# Patient Record
Sex: MALE | Race: WHITE | NOT HISPANIC OR LATINO | URBAN - METROPOLITAN AREA
[De-identification: names, ages, dates, MRNs, and addresses within clinical notes are randomized per-mention and may not be internally consistent; named-entity substitution may affect disease eponyms.]

---

## 2018-02-21 ENCOUNTER — INPATIENT (INPATIENT)
Facility: HOSPITAL | Age: 63
LOS: 0 days | Discharge: HOME | End: 2018-02-22
Attending: INTERNAL MEDICINE | Admitting: INTERNAL MEDICINE
Payer: COMMERCIAL

## 2018-02-21 VITALS
SYSTOLIC BLOOD PRESSURE: 117 MMHG | HEART RATE: 210 BPM | RESPIRATION RATE: 20 BRPM | DIASTOLIC BLOOD PRESSURE: 75 MMHG | OXYGEN SATURATION: 100 %

## 2018-02-21 DIAGNOSIS — Z29.9 ENCOUNTER FOR PROPHYLACTIC MEASURES, UNSPECIFIED: ICD-10-CM

## 2018-02-21 DIAGNOSIS — R79.89 OTHER SPECIFIED ABNORMAL FINDINGS OF BLOOD CHEMISTRY: ICD-10-CM

## 2018-02-21 DIAGNOSIS — R00.0 TACHYCARDIA, UNSPECIFIED: ICD-10-CM

## 2018-02-21 DIAGNOSIS — R94.31 ABNORMAL ELECTROCARDIOGRAM [ECG] [EKG]: ICD-10-CM

## 2018-02-21 DIAGNOSIS — E03.9 HYPOTHYROIDISM, UNSPECIFIED: ICD-10-CM

## 2018-02-21 LAB
ALBUMIN SERPL ELPH-MCNC: 4.7 G/DL — SIGNIFICANT CHANGE UP (ref 3.3–5)
ALP SERPL-CCNC: 77 U/L — SIGNIFICANT CHANGE UP (ref 40–120)
ALT FLD-CCNC: 78 U/L — HIGH (ref 4–41)
APTT BLD: 39.5 SEC — HIGH (ref 27.5–37.4)
APTT BLD: 57.6 SEC — HIGH (ref 27.5–37.4)
APTT BLD: 60.9 SEC — HIGH (ref 27.5–37.4)
AST SERPL-CCNC: 69 U/L — HIGH (ref 4–40)
BASOPHILS # BLD AUTO: 0.03 K/UL — SIGNIFICANT CHANGE UP (ref 0–0.2)
BASOPHILS # BLD AUTO: 0.04 K/UL — SIGNIFICANT CHANGE UP (ref 0–0.2)
BASOPHILS NFR BLD AUTO: 0.3 % — SIGNIFICANT CHANGE UP (ref 0–2)
BASOPHILS NFR BLD AUTO: 0.4 % — SIGNIFICANT CHANGE UP (ref 0–2)
BILIRUB SERPL-MCNC: 0.3 MG/DL — SIGNIFICANT CHANGE UP (ref 0.2–1.2)
BUN SERPL-MCNC: 17 MG/DL — SIGNIFICANT CHANGE UP (ref 7–23)
BUN SERPL-MCNC: 17 MG/DL — SIGNIFICANT CHANGE UP (ref 7–23)
CALCIUM SERPL-MCNC: 9.4 MG/DL — SIGNIFICANT CHANGE UP (ref 8.4–10.5)
CALCIUM SERPL-MCNC: 9.5 MG/DL — SIGNIFICANT CHANGE UP (ref 8.4–10.5)
CHLORIDE SERPL-SCNC: 101 MMOL/L — SIGNIFICANT CHANGE UP (ref 98–107)
CHLORIDE SERPL-SCNC: 104 MMOL/L — SIGNIFICANT CHANGE UP (ref 98–107)
CHOLEST SERPL-MCNC: 204 MG/DL — HIGH (ref 120–199)
CK MB BLD-MCNC: 1.4 — SIGNIFICANT CHANGE UP (ref 0–2.5)
CK MB BLD-MCNC: 2.7 — HIGH (ref 0–2.5)
CK MB BLD-MCNC: 3.4 — HIGH (ref 0–2.5)
CK MB BLD-MCNC: 5.32 NG/ML — SIGNIFICANT CHANGE UP (ref 1–6.6)
CK MB BLD-MCNC: 7.76 NG/ML — HIGH (ref 1–6.6)
CK MB BLD-MCNC: 7.87 NG/ML — HIGH (ref 1–6.6)
CK SERPL-CCNC: 233 U/L — HIGH (ref 30–200)
CK SERPL-CCNC: 285 U/L — HIGH (ref 30–200)
CK SERPL-CCNC: 377 U/L — HIGH (ref 30–200)
CO2 SERPL-SCNC: 27 MMOL/L — SIGNIFICANT CHANGE UP (ref 22–31)
CO2 SERPL-SCNC: 27 MMOL/L — SIGNIFICANT CHANGE UP (ref 22–31)
CREAT SERPL-MCNC: 0.91 MG/DL — SIGNIFICANT CHANGE UP (ref 0.5–1.3)
CREAT SERPL-MCNC: 1.12 MG/DL — SIGNIFICANT CHANGE UP (ref 0.5–1.3)
EOSINOPHIL # BLD AUTO: 0.08 K/UL — SIGNIFICANT CHANGE UP (ref 0–0.5)
EOSINOPHIL # BLD AUTO: 0.28 K/UL — SIGNIFICANT CHANGE UP (ref 0–0.5)
EOSINOPHIL NFR BLD AUTO: 1.1 % — SIGNIFICANT CHANGE UP (ref 0–6)
EOSINOPHIL NFR BLD AUTO: 2.4 % — SIGNIFICANT CHANGE UP (ref 0–6)
GLUCOSE SERPL-MCNC: 107 MG/DL — HIGH (ref 70–99)
GLUCOSE SERPL-MCNC: 147 MG/DL — HIGH (ref 70–99)
HBA1C BLD-MCNC: 6.1 % — HIGH (ref 4–5.6)
HCT VFR BLD CALC: 44.4 % — SIGNIFICANT CHANGE UP (ref 39–50)
HCT VFR BLD CALC: 44.9 % — SIGNIFICANT CHANGE UP (ref 39–50)
HCT VFR BLD CALC: 48 % — SIGNIFICANT CHANGE UP (ref 39–50)
HDLC SERPL-MCNC: 49 MG/DL — SIGNIFICANT CHANGE UP (ref 35–55)
HGB BLD-MCNC: 14.9 G/DL — SIGNIFICANT CHANGE UP (ref 13–17)
HGB BLD-MCNC: 15 G/DL — SIGNIFICANT CHANGE UP (ref 13–17)
HGB BLD-MCNC: 16.1 G/DL — SIGNIFICANT CHANGE UP (ref 13–17)
IMM GRANULOCYTES # BLD AUTO: 0.02 # — SIGNIFICANT CHANGE UP
IMM GRANULOCYTES # BLD AUTO: 0.03 # — SIGNIFICANT CHANGE UP
IMM GRANULOCYTES NFR BLD AUTO: 0.3 % — SIGNIFICANT CHANGE UP (ref 0–1.5)
IMM GRANULOCYTES NFR BLD AUTO: 0.3 % — SIGNIFICANT CHANGE UP (ref 0–1.5)
INR BLD: 0.97 — SIGNIFICANT CHANGE UP (ref 0.88–1.17)
LIPID PNL WITH DIRECT LDL SERPL: 143 MG/DL — SIGNIFICANT CHANGE UP
LYMPHOCYTES # BLD AUTO: 2.1 K/UL — SIGNIFICANT CHANGE UP (ref 1–3.3)
LYMPHOCYTES # BLD AUTO: 28.7 % — SIGNIFICANT CHANGE UP (ref 13–44)
LYMPHOCYTES # BLD AUTO: 45 % — HIGH (ref 13–44)
LYMPHOCYTES # BLD AUTO: 5.36 K/UL — HIGH (ref 1–3.3)
MAGNESIUM SERPL-MCNC: 2.2 MG/DL — SIGNIFICANT CHANGE UP (ref 1.6–2.6)
MCHC RBC-ENTMCNC: 29.3 PG — SIGNIFICANT CHANGE UP (ref 27–34)
MCHC RBC-ENTMCNC: 30.1 PG — SIGNIFICANT CHANGE UP (ref 27–34)
MCHC RBC-ENTMCNC: 30.4 PG — SIGNIFICANT CHANGE UP (ref 27–34)
MCHC RBC-ENTMCNC: 33.2 % — SIGNIFICANT CHANGE UP (ref 32–36)
MCHC RBC-ENTMCNC: 33.5 % — SIGNIFICANT CHANGE UP (ref 32–36)
MCHC RBC-ENTMCNC: 33.8 % — SIGNIFICANT CHANGE UP (ref 32–36)
MCV RBC AUTO: 88.2 FL — SIGNIFICANT CHANGE UP (ref 80–100)
MCV RBC AUTO: 89.9 FL — SIGNIFICANT CHANGE UP (ref 80–100)
MCV RBC AUTO: 90.1 FL — SIGNIFICANT CHANGE UP (ref 80–100)
MONOCYTES # BLD AUTO: 0.42 K/UL — SIGNIFICANT CHANGE UP (ref 0–0.9)
MONOCYTES # BLD AUTO: 0.75 K/UL — SIGNIFICANT CHANGE UP (ref 0–0.9)
MONOCYTES NFR BLD AUTO: 5.7 % — SIGNIFICANT CHANGE UP (ref 2–14)
MONOCYTES NFR BLD AUTO: 6.3 % — SIGNIFICANT CHANGE UP (ref 2–14)
NEUTROPHILS # BLD AUTO: 4.67 K/UL — SIGNIFICANT CHANGE UP (ref 1.8–7.4)
NEUTROPHILS # BLD AUTO: 5.45 K/UL — SIGNIFICANT CHANGE UP (ref 1.8–7.4)
NEUTROPHILS NFR BLD AUTO: 45.7 % — SIGNIFICANT CHANGE UP (ref 43–77)
NEUTROPHILS NFR BLD AUTO: 63.8 % — SIGNIFICANT CHANGE UP (ref 43–77)
NRBC # FLD: 0 — SIGNIFICANT CHANGE UP
PHOSPHATE SERPL-MCNC: 3.6 MG/DL — SIGNIFICANT CHANGE UP (ref 2.5–4.5)
PLATELET # BLD AUTO: 189 K/UL — SIGNIFICANT CHANGE UP (ref 150–400)
PLATELET # BLD AUTO: 197 K/UL — SIGNIFICANT CHANGE UP (ref 150–400)
PLATELET # BLD AUTO: 221 K/UL — SIGNIFICANT CHANGE UP (ref 150–400)
PMV BLD: 9.8 FL — SIGNIFICANT CHANGE UP (ref 7–13)
PMV BLD: 9.9 FL — SIGNIFICANT CHANGE UP (ref 7–13)
PMV BLD: 9.9 FL — SIGNIFICANT CHANGE UP (ref 7–13)
POTASSIUM SERPL-MCNC: 4 MMOL/L — SIGNIFICANT CHANGE UP (ref 3.5–5.3)
POTASSIUM SERPL-MCNC: 4.3 MMOL/L — SIGNIFICANT CHANGE UP (ref 3.5–5.3)
POTASSIUM SERPL-SCNC: 4 MMOL/L — SIGNIFICANT CHANGE UP (ref 3.5–5.3)
POTASSIUM SERPL-SCNC: 4.3 MMOL/L — SIGNIFICANT CHANGE UP (ref 3.5–5.3)
PROT SERPL-MCNC: 7.1 G/DL — SIGNIFICANT CHANGE UP (ref 6–8.3)
PROTHROM AB SERPL-ACNC: 10.8 SEC — SIGNIFICANT CHANGE UP (ref 9.8–13.1)
RBC # BLD: 4.93 M/UL — SIGNIFICANT CHANGE UP (ref 4.2–5.8)
RBC # BLD: 5.09 M/UL — SIGNIFICANT CHANGE UP (ref 4.2–5.8)
RBC # BLD: 5.34 M/UL — SIGNIFICANT CHANGE UP (ref 4.2–5.8)
RBC # FLD: 12.4 % — SIGNIFICANT CHANGE UP (ref 10.3–14.5)
RBC # FLD: 12.4 % — SIGNIFICANT CHANGE UP (ref 10.3–14.5)
RBC # FLD: 12.5 % — SIGNIFICANT CHANGE UP (ref 10.3–14.5)
SODIUM SERPL-SCNC: 141 MMOL/L — SIGNIFICANT CHANGE UP (ref 135–145)
SODIUM SERPL-SCNC: 143 MMOL/L — SIGNIFICANT CHANGE UP (ref 135–145)
T4 FREE SERPL-MCNC: 1.43 NG/DL — SIGNIFICANT CHANGE UP (ref 0.9–1.8)
TRIGL SERPL-MCNC: 157 MG/DL — HIGH (ref 10–149)
TROPONIN T SERPL-MCNC: 0.09 NG/ML — HIGH (ref 0–0.06)
TROPONIN T SERPL-MCNC: < 0.06 NG/ML — SIGNIFICANT CHANGE UP (ref 0–0.06)
TROPONIN T SERPL-MCNC: < 0.06 NG/ML — SIGNIFICANT CHANGE UP (ref 0–0.06)
TSH SERPL-MCNC: 2.41 UIU/ML — SIGNIFICANT CHANGE UP (ref 0.27–4.2)
WBC # BLD: 11.91 K/UL — HIGH (ref 3.8–10.5)
WBC # BLD: 6.54 K/UL — SIGNIFICANT CHANGE UP (ref 3.8–10.5)
WBC # BLD: 7.32 K/UL — SIGNIFICANT CHANGE UP (ref 3.8–10.5)
WBC # FLD AUTO: 11.91 K/UL — HIGH (ref 3.8–10.5)
WBC # FLD AUTO: 6.54 K/UL — SIGNIFICANT CHANGE UP (ref 3.8–10.5)
WBC # FLD AUTO: 7.32 K/UL — SIGNIFICANT CHANGE UP (ref 3.8–10.5)

## 2018-02-21 PROCEDURE — 76705 ECHO EXAM OF ABDOMEN: CPT | Mod: 26

## 2018-02-21 PROCEDURE — 99233 SBSQ HOSP IP/OBS HIGH 50: CPT

## 2018-02-21 PROCEDURE — 71045 X-RAY EXAM CHEST 1 VIEW: CPT | Mod: 26

## 2018-02-21 RX ORDER — HEPARIN SODIUM 5000 [USP'U]/ML
5000 INJECTION INTRAVENOUS; SUBCUTANEOUS EVERY 12 HOURS
Qty: 0 | Refills: 0 | Status: DISCONTINUED | OUTPATIENT
Start: 2018-02-21 | End: 2018-02-21

## 2018-02-21 RX ORDER — PANTOPRAZOLE SODIUM 20 MG/1
40 TABLET, DELAYED RELEASE ORAL
Qty: 0 | Refills: 0 | Status: DISCONTINUED | OUTPATIENT
Start: 2018-02-21 | End: 2018-02-22

## 2018-02-21 RX ORDER — ACETAMINOPHEN 500 MG
650 TABLET ORAL ONCE
Qty: 0 | Refills: 0 | Status: COMPLETED | OUTPATIENT
Start: 2018-02-21 | End: 2018-02-21

## 2018-02-21 RX ORDER — TRAMADOL HYDROCHLORIDE 50 MG/1
25 TABLET ORAL ONCE
Qty: 0 | Refills: 0 | Status: DISCONTINUED | OUTPATIENT
Start: 2018-02-21 | End: 2018-02-21

## 2018-02-21 RX ORDER — HEPARIN SODIUM 5000 [USP'U]/ML
INJECTION INTRAVENOUS; SUBCUTANEOUS
Qty: 25000 | Refills: 0 | Status: DISCONTINUED | OUTPATIENT
Start: 2018-02-21 | End: 2018-02-22

## 2018-02-21 RX ORDER — HEPARIN SODIUM 5000 [USP'U]/ML
5600 INJECTION INTRAVENOUS; SUBCUTANEOUS EVERY 6 HOURS
Qty: 0 | Refills: 0 | Status: DISCONTINUED | OUTPATIENT
Start: 2018-02-21 | End: 2018-02-22

## 2018-02-21 RX ORDER — SODIUM CHLORIDE 9 MG/ML
3 INJECTION INTRAMUSCULAR; INTRAVENOUS; SUBCUTANEOUS EVERY 8 HOURS
Qty: 0 | Refills: 0 | Status: DISCONTINUED | OUTPATIENT
Start: 2018-02-21 | End: 2018-02-22

## 2018-02-21 RX ORDER — METOPROLOL TARTRATE 50 MG
25 TABLET ORAL
Qty: 0 | Refills: 0 | Status: DISCONTINUED | OUTPATIENT
Start: 2018-02-21 | End: 2018-02-22

## 2018-02-21 RX ORDER — LEVOTHYROXINE SODIUM 125 MCG
175 TABLET ORAL DAILY
Qty: 0 | Refills: 0 | Status: DISCONTINUED | OUTPATIENT
Start: 2018-02-21 | End: 2018-02-22

## 2018-02-21 RX ORDER — HEPARIN SODIUM 5000 [USP'U]/ML
5000 INJECTION INTRAVENOUS; SUBCUTANEOUS ONCE
Qty: 0 | Refills: 0 | Status: DISCONTINUED | OUTPATIENT
Start: 2018-02-21 | End: 2018-02-21

## 2018-02-21 RX ORDER — ASPIRIN/CALCIUM CARB/MAGNESIUM 324 MG
162 TABLET ORAL ONCE
Qty: 0 | Refills: 0 | Status: COMPLETED | OUTPATIENT
Start: 2018-02-21 | End: 2018-02-21

## 2018-02-21 RX ADMIN — TRAMADOL HYDROCHLORIDE 25 MILLIGRAM(S): 50 TABLET ORAL at 15:55

## 2018-02-21 RX ADMIN — HEPARIN SODIUM 1000 UNIT(S)/HR: 5000 INJECTION INTRAVENOUS; SUBCUTANEOUS at 23:42

## 2018-02-21 RX ADMIN — SODIUM CHLORIDE 3 MILLILITER(S): 9 INJECTION INTRAMUSCULAR; INTRAVENOUS; SUBCUTANEOUS at 21:38

## 2018-02-21 RX ADMIN — Medication 650 MILLIGRAM(S): at 06:50

## 2018-02-21 RX ADMIN — SODIUM CHLORIDE 3 MILLILITER(S): 9 INJECTION INTRAMUSCULAR; INTRAVENOUS; SUBCUTANEOUS at 13:15

## 2018-02-21 RX ADMIN — HEPARIN SODIUM 1000 UNIT(S)/HR: 5000 INJECTION INTRAVENOUS; SUBCUTANEOUS at 10:35

## 2018-02-21 RX ADMIN — Medication 162 MILLIGRAM(S): at 01:38

## 2018-02-21 RX ADMIN — TRAMADOL HYDROCHLORIDE 25 MILLIGRAM(S): 50 TABLET ORAL at 14:57

## 2018-02-21 RX ADMIN — SODIUM CHLORIDE 3 MILLILITER(S): 9 INJECTION INTRAMUSCULAR; INTRAVENOUS; SUBCUTANEOUS at 06:00

## 2018-02-21 RX ADMIN — HEPARIN SODIUM 1000 UNIT(S)/HR: 5000 INJECTION INTRAVENOUS; SUBCUTANEOUS at 17:30

## 2018-02-21 RX ADMIN — Medication 175 MICROGRAM(S): at 06:00

## 2018-02-21 RX ADMIN — Medication 25 MILLIGRAM(S): at 18:28

## 2018-02-21 RX ADMIN — Medication 650 MILLIGRAM(S): at 06:20

## 2018-02-21 NOTE — CONSULT NOTE ADULT - SUBJECTIVE AND OBJECTIVE BOX
Patient seen and evaluated at bedside    Chief Complaint: palpitations    HPI:  63M with hypothyroidism presents with palpitations and chest pressure since 11:30 pm. Patient was sleeping and woke up with palpitations. Palpitations were constant, and associated with chest burning, diaphoresis and SOB. He waited an hour to see if the palpitations would resolve, then came to the hospital at approximately 1:30AM. He was found to have HR in 210s with narrow complex regular tachyarrhythmia, which broke with valsalva maneuver. Denies fever, chills, cough, falls, LOC, abdominal pain, melena, hematochezia, LE edema, calf tenderness, dysuria, diarrhea, or constipation.  Patient never experienced this before. He only takes synthroid at home and claims that his hypothyroidism has been well controlled. He denies taking herbal supplements. He drinks 2 cups of coffee a day and on average has 4 drinks of alcohol a week. He works as a banker and says that recently he has had some increased stress with his job.      PMH:   Hypothyroid      PSH:   No significant past surgical history      Medications:   heparin  Infusion.  Unit(s)/Hr IV Continuous <Continuous>  heparin  Injectable 5600 Unit(s) IV Push every 6 hours PRN  levothyroxine 175 MICROGram(s) Oral daily  sodium chloride 0.9% lock flush 3 milliLiter(s) IV Push every 8 hours      Allergies:  multiple drugs (Unknown)  predniSONE (Unknown)  steroids (Unknown)      FAMILY HISTORY:  No pertinent family history in first degree relatives      Social History:  Smoking History: denies  Alcohol Use: socially  Drug Use: denies    Review of Systems:  REVIEW OF SYSTEMS:    CONSTITUTIONAL: No weakness, fevers or chills  EYES/ENT: No visual changes;  No dysphagia  NECK: No pain or stiffness  RESPIRATORY: No cough, wheezing, hemoptysis; No shortness of breath  CARDIOVASCULAR: +chest pain; +palpitations; No lower extremity edema  GASTROINTESTINAL: No abdominal or epigastric pain. No nausea, vomiting, or hematemesis; No diarrhea or constipation. No melena or hematochezia.  BACK: No back pain  GENITOURINARY: No dysuria, frequency or hematuria  NEUROLOGICAL: No numbness or weakness  SKIN: No itching, burning, rashes, or lesions   All other review of systems is negative unless indicated above.    Physical Exam:  T(F): 97.5 (02-21), Max: 97.8 (02-21)  HR: 70 () (70 - 210)  BP: 133/71 () (117/75 - 145/72)  RR: 15 ()  SpO2: 98% ()  GENERAL: No acute distress, well-developed  HEAD:  Atraumatic, Normocephalic  ENT: EOMI, No JVD, moist mucosa  CHEST/LUNG: Clear to auscultation bilaterally; No wheeze, equal breath sounds bilaterally   HEART: Regular rate and rhythm; No murmurs, rubs, or gallops  ABDOMEN: Soft, Nontender, Nondistended; Bowel sounds present  EXTREMITIES:  No clubbing, cyanosis, or edema  PSYCH: Nl behavior, nl affect  NEUROLOGY: AAOx3, non-focal    Cardiovascular Diagnostic Testing:    ECG: Personally reviewed  SR, HR 90, normal axis with normal intervals    Labs: Personally reviewed                        15.0   7.32  )-----------( 189      ( 2018 07:25 )             44.4         141  |  104  |  17  ----------------------------<  107<H>  4.3   |  27  |  0.91    Ca    9.4      2018 07:25  Phos  3.6       Mg     2.2         TPro  7.1  /  Alb  4.7  /  TBili  0.3  /  DBili  x   /  AST  69<H>  /  ALT  78<H>  /  AlkPhos  77      PT/INR - ( 2018 01:52 )   PT: 10.8 SEC;   INR: 0.97          PTT - ( 2018 01:52 )  PTT:39.5 SEC  CARDIAC MARKERS ( 2018 07:25 )  x     / 0.09 ng/mL / 285 u/L / 7.76 ng/mL / x      CARDIAC MARKERS ( 2018 01:52 )  x     / < 0.06 ng/mL / 377 u/L / 5.32 ng/mL / x            Total Cholesterol: 204  LDL: 143  HDL: 49  T      Thyroid Stimulating Hormone, Serum: 2.41 uIU/mL ( @ 01:52)      A/P:  63M with hypothyroidism presents with palpitations and chest pressure found to be in SVT, converted to NSR with valsalva maneuver.    SVT. Now in SR. Short RP tachycardia, could be AVNRT.   - patient opposed to ablation  - TTE pending  - metoprolol 25 mg BID for suppression    Rogerio Piedra MD  Cardiology Fellow [id number]

## 2018-02-21 NOTE — H&P ADULT - HISTORY OF PRESENT ILLNESS
64 y/o F with hx of hypothyroidism presents with palpitations since 11:30 pm. Patient was sleeping and woke up with palpitations. Palpitations were constant, and associated with chest burning, diaphoresis and SOB. He wait an hour to see if the palpitations will resolve but it did not resolve on its own. He came to ED approximately 1:30A. He was found to have HR in 210s with narrow complex regular tachyarrhythmia and which was broke with valsalva maneuver. Denies fever, chills, cough, falls, LOC, abdominal pain, melena, hematochezia, LE edema, claf tenderness, dysuria, diarrhea, or constipation.  He states he was doing well and had no complaints prior.

## 2018-02-21 NOTE — H&P ADULT - PROBLEM SELECTOR PLAN 1
Admit to tele  check cbc, bmp, a1c, flp, tsh, trend CE  echo ordered   s/p cardizem x1   f/u MD note

## 2018-02-21 NOTE — ED PROVIDER NOTE - PROGRESS NOTE DETAILS
BP remains stable, pt alternating between narrow complex tachyarrhythmia and sinus rhythm. Alert throughout, responds to vagal maneuvers. Accepted for BP remains stable, pt alternating between narrow complex tachyarrhythmia and sinus rhythm. Alert throughout, responds to vagal maneuvers. Accepted for admission to tele doc (dr. Azevedo). Requests 30mg PO cardizem be given.

## 2018-02-21 NOTE — ED PROVIDER NOTE - MEDICAL DECISION MAKING DETAILS
63M with initial narrow complex tachyarrhythmia, resolved after valsalva, now with hyperacute T waves and poor R wave progression on EKG, stable VS. Give aspirin, obtain labs, cardiac enzymes, serial ekgs, cxr, will require tele admission.

## 2018-02-21 NOTE — CONSULT NOTE ADULT - PROBLEM SELECTOR RECOMMENDATION 2
- mildly elevated; pt drank 2-3 beers yesterday evening (night of admission)  - US Abd with hepatic steatosis   - hepatitis panel pending

## 2018-02-21 NOTE — CONSULT NOTE ADULT - SUBJECTIVE AND OBJECTIVE BOX
Chief Complaint:  Patient is a 63y old  Male who presents with a chief complaint of palpitations (2018 05:53)    Hypothyroid  No significant past surgical history     HPI:  64 y/o F with hx of hypothyroidism presents with palpitations since 11:30 pm. Patient was sleeping and woke up with palpitations. Palpitations were constant, and associated with chest burning, diaphoresis and SOB. He waited an hour to see if the palpitations will resolve but it did not resolve on its own. He came to ED approximately 1:30A. He was found to have HR in 210s with narrow complex regular tachyarrhythmia and which was broke with valsalva maneuver.  The patient reports no abdominal complaints. No n/v/d/ or abdominal pain. He is noted with mildly increased LFTs while in the ED. He admits that last night he drank 2-3 beers after work. He is a social drinker, no recreational drug use. He had hepatitis A when he was 19-20 years old after eating clams. Last year he had GERD symptoms for which he went to an ENT and got a nasal scope and he was started on anti-reflux medications with relief and is no longer with any difficulties.  Denies fever, chills, cough, falls, LOC, abdominal pain, melena, hematochezia, LE edema, claf tenderness, dysuria, diarrhea, or constipation.  He states he was doing well and had no complaints prior.     Colonoscopy about 9 years ago was "normal"      multiple drugs (Unknown)  predniSONE (Unknown)  steroids (Unknown)      heparin  Infusion.  Unit(s)/Hr IV Continuous <Continuous>  heparin  Injectable 5600 Unit(s) IV Push every 6 hours PRN  levothyroxine 175 MICROGram(s) Oral daily  sodium chloride 0.9% lock flush 3 milliLiter(s) IV Push every 8 hours  traMADol 25 milliGRAM(s) Oral once        FAMILY HISTORY:  No pertinent family history in first degree relatives        Review of Systems:    General:  No wt loss, fevers, chills, night sweats, fatigue  Eyes:  Good vision, no reported pain  ENT:  No sore throat, pain, runny nose, dysphagia  CV:  No pain, palpitations, no lightheadedness  Resp:  No dyspnea, cough, tachypnea, wheezing  GI: as above  :  No pain, bleeding, incontinence, nocturia  Muscle:  No pain, weakness  Neuro:  No weakness, tingling, memory problems  Psych:  No fatigue, insomnia, mood problems, depression  Endocrine:  No polyuria, polydypsia, cold/heat intolerance  Heme:  No petechiae, ecchymosis, easy bruisability  Skin:  No rash, tattoos, scars, edema    Relevant Family History:   n/c    Relevant Social History: n/c      Physical Exam:    Vital Signs:  Vital Signs Last 24 Hrs  T(C): 36.4 (2018 07:31), Max: 36.6 (2018 01:24)  T(F): 97.5 (2018 07:31), Max: 97.8 (2018 01:24)  HR: 65 (:34) (65 - 210)  BP: 173/91 (2018 13:34) (117/75 - 173/91)  BP(mean): --  RR: 16 (:34) (15 - 22)  SpO2: 100% (:) (98% - 100%)  Daily Height in cm: 180.34 (2018 06:00)    Daily Weight in k.7 (2018 06:00)    General:  Appears stated age, well-groomed, nad  HEENT:  NC/AT,  conjunctivae clear and pink, no thyromegaly, nodules, adenopathy, no JVD  Chest:  Full & symmetric excursion, no increased effort, breath sounds clear  Cardiovascular:  Regular rhythm, S1, S2, no murmur/rub/S3/S4, no abdominal bruit, no edema  Abdomen:  Soft, non-tender, non-distended, normoactive bowel sounds,  no masses ,no hepatosplenomeagaly, no signs of chronic liver disease  Extremities:  no cyanosis,clubbing or edema  Skin:  No rash/erythema/ecchymoses/petechiae/wounds/abscess/warm/dry  Neuro/Psych:  A&O  , no asterixis, no tremor, no encephalopathy    Laboratory:                            15.0   7.32  )-----------( 189      ( 2018 07:25 )             44.4     -    141  |  104  |  17  ----------------------------<  107<H>  4.3   |  27  |  0.91    Ca    9.4      2018 07:25  Phos  3.6       Mg     2.2         TPro  7.1  /  Alb  4.7  /  TBili  0.3  /  DBili  x   /  AST  69<H>  /  ALT  78<H>  /  AlkPhos  77      LIVER FUNCTIONS - ( 2018 01:52 )  Alb: 4.7 g/dL / Pro: 7.1 g/dL / ALK PHOS: 77 u/L / ALT: 78 u/L / AST: 69 u/L / GGT: x           PT/INR - ( 2018 01:52 )   PT: 10.8 SEC;   INR: 0.97          PTT - ( 2018 01:52 )  PTT:39.5 SEC      Imaging:      < from: US Abdomen Limited (18 @ 13:05) >    EXAM:  US ABDOMEN LIMITED        PROCEDURE DATE:  2018         INTERPRETATION:  CLINICAL INFORMATION: Elevated liver function tests.    COMPARISON: None available.    TECHNIQUE: Sonography of the right upper quadrant.     FINDINGS:    Liver:Mildly diffuse increased echogenicity.    Bile ducts: Normal caliber. Common hepatic duct measures 2 mm.     Gallbladder: Within normal limits.        Pancreas: Visualized portions are within normal limits.    Right kidney: 10.9 cm. No hydronephrosis.    Ascites: None.    IVC: Visualized portions are within normal limits.    IMPRESSION:     Suggestive of mild diffuse hepatic steatosis.                  NAHUM PIERRE M.D., ATTENDING RADIOLOGIST  This document has been electronically signed. 2018  1:30PM                  < end of copied text >

## 2018-02-21 NOTE — ED PROVIDER NOTE - ATTENDING CONTRIBUTION TO CARE
64 y/o M h/o hypothyroidism, hyperlipidemia p/w palpitations.  Pt reports he was awoken from sleep at 11:30pm with sensation of palpitations (described as his heart beating rapidly in his chest).  Pt endorses assocaited midsternal burning sensation.  No previous h/o similar sxs.  No fever, chills, cough, sob, back pain.  No recent travel, sick contacts, or recent illness.  Pt states he went to bed last night in his usual state of health.  Denies drug, alcohol or recent caffeine use.  Pt arrived in triage and found to be tachycardic to 200s.  Pt brought back to trauma C, placed on cardiac monitor with a narrow complex tachycardia of 211, which improved to 90s after vagal maneuvers.  Well appearing, sitting comfortably in stretcher, awake and alert, nontoxic.  VSS, tachycardic, normotensive.  Lungs cta bl.  Cards nl S1/S2, tachy reg, no MRG.  Abd soft ntnd.  No pedal edema or calf tenderness.  Plan for ekg, labs incl cardiac and tsh, cxr, asa, admit tele for new narrow complex tachycardia, likely svt, with chest discomfort.  r/o acs vs metabolic/thyroid vs infectious.

## 2018-02-21 NOTE — ED ADULT NURSE NOTE - OBJECTIVE STATEMENT
sydney rn-pt received trauma c, alert and orientedx3. c.o palpitations and diaphoresis waking him from sleep at 1130pm. denies chest pain dizziness sob nausea vomiting. heart rate noted to be in 200's. MD plummer at bedside. pt able to convert hr to normal sinus with valsalva maneuver. 20g IV placed, right ac. labs sent. EKG being performed. rpt given to SIMEON anne

## 2018-02-21 NOTE — ED PROVIDER NOTE - OBJECTIVE STATEMENT
63M PMH hypothyroidism p/w sudden onset palpitations at 11:30pm that woke him from sleep, a/w diaphoresis and SOB. No discrete chest pain. Felt fine during the day prior to this. Arrived in ED with HR in 210's in narrow complex regular tachyarrhythmia, brought directly to trauma C where rhythm broke with valsalva maneuver while blood being drawn.

## 2018-02-21 NOTE — H&P ADULT - PROBLEM SELECTOR PLAN 2
EKG with <1mm diffuse DESHAUN. Most likely early repol. v ?pericarditis but less likely given pt is chest pain free. Cardiac enzymes negative.  Patient also with no recent illness or respiratory illness.   Echo ordered

## 2018-02-21 NOTE — ED ADULT TRIAGE NOTE - CHIEF COMPLAINT QUOTE
pt woke up with palpitation. appears very pale, diaphoretic, c/o chest discomfort and SOB. pt is cold and clammy. says " I am very nervous". PMH of Hypothyroidism, Has multiple medication allergies.

## 2018-02-21 NOTE — CONSULT NOTE ADULT - ASSESSMENT
62 y/o F with hx of hypothyroidism presents with palpitations since 11:30 pm admitted for episode of tachyarrhythmia being followed by EP. He was also noted with increased LFTs with US Abd noted w/hepatic steatosis.

## 2018-02-21 NOTE — H&P ADULT - NSHPLABSRESULTS_GEN_ALL_CORE
EKG: EKG: NSR 96, Diffuse <1mm DESHAUN                          16.1   11.91 )-----------( 221      ( 21 Feb 2018 01:52 )             48.0     02-21    143  |  101  |  17  ----------------------------<  147<H>  4.0   |  27  |  1.12    Ca    9.5      21 Feb 2018 01:52    TPro  7.1  /  Alb  4.7  /  TBili  0.3  /  DBili  x   /  AST  69<H>  /  ALT  78<H>  /  AlkPhos  77  02-21    CARDIAC MARKERS ( 21 Feb 2018 01:52 )  x     / < 0.06 ng/mL / 377 u/L / 5.32 ng/mL / x

## 2018-02-21 NOTE — CONSULT NOTE ADULT - ATTENDING COMMENTS
Symptomatic sustained SVT terminated with carotid sinus massage. No strips with termination. Wants to try meds first. Will start metoprolol and follow as outpt if recurs. Ablation discussed with patient if recurs in future.

## 2018-02-22 ENCOUNTER — TRANSCRIPTION ENCOUNTER (OUTPATIENT)
Age: 63
End: 2018-02-22

## 2018-02-22 VITALS — HEART RATE: 70 BPM | DIASTOLIC BLOOD PRESSURE: 84 MMHG | SYSTOLIC BLOOD PRESSURE: 143 MMHG

## 2018-02-22 LAB
APTT BLD: 57.2 SEC — HIGH (ref 27.5–37.4)
BUN SERPL-MCNC: 14 MG/DL — SIGNIFICANT CHANGE UP (ref 7–23)
CALCIUM SERPL-MCNC: 8.8 MG/DL — SIGNIFICANT CHANGE UP (ref 8.4–10.5)
CHLORIDE SERPL-SCNC: 102 MMOL/L — SIGNIFICANT CHANGE UP (ref 98–107)
CK MB BLD-MCNC: 2.6 — HIGH (ref 0–2.5)
CK MB BLD-MCNC: 4.01 NG/ML — SIGNIFICANT CHANGE UP (ref 1–6.6)
CK SERPL-CCNC: 156 U/L — SIGNIFICANT CHANGE UP (ref 30–200)
CO2 SERPL-SCNC: 24 MMOL/L — SIGNIFICANT CHANGE UP (ref 22–31)
CREAT SERPL-MCNC: 0.87 MG/DL — SIGNIFICANT CHANGE UP (ref 0.5–1.3)
GLUCOSE SERPL-MCNC: 111 MG/DL — HIGH (ref 70–99)
HAV IGM SER-ACNC: NONREACTIVE — SIGNIFICANT CHANGE UP
HBV CORE IGM SER-ACNC: NONREACTIVE — SIGNIFICANT CHANGE UP
HBV SURFACE AG SER-ACNC: NONREACTIVE — SIGNIFICANT CHANGE UP
HCT VFR BLD CALC: 44.7 % — SIGNIFICANT CHANGE UP (ref 39–50)
HCV AB S/CO SERPL IA: 0.07 S/CO — SIGNIFICANT CHANGE UP
HCV AB SERPL-IMP: SIGNIFICANT CHANGE UP
HGB BLD-MCNC: 15.2 G/DL — SIGNIFICANT CHANGE UP (ref 13–17)
MAGNESIUM SERPL-MCNC: 2 MG/DL — SIGNIFICANT CHANGE UP (ref 1.6–2.6)
MCHC RBC-ENTMCNC: 30.4 PG — SIGNIFICANT CHANGE UP (ref 27–34)
MCHC RBC-ENTMCNC: 34 % — SIGNIFICANT CHANGE UP (ref 32–36)
MCV RBC AUTO: 89.4 FL — SIGNIFICANT CHANGE UP (ref 80–100)
NRBC # FLD: 0 — SIGNIFICANT CHANGE UP
PLATELET # BLD AUTO: 197 K/UL — SIGNIFICANT CHANGE UP (ref 150–400)
PMV BLD: 10 FL — SIGNIFICANT CHANGE UP (ref 7–13)
POTASSIUM SERPL-MCNC: 4.1 MMOL/L — SIGNIFICANT CHANGE UP (ref 3.5–5.3)
POTASSIUM SERPL-SCNC: 4.1 MMOL/L — SIGNIFICANT CHANGE UP (ref 3.5–5.3)
RBC # BLD: 5 M/UL — SIGNIFICANT CHANGE UP (ref 4.2–5.8)
RBC # FLD: 12.5 % — SIGNIFICANT CHANGE UP (ref 10.3–14.5)
SODIUM SERPL-SCNC: 139 MMOL/L — SIGNIFICANT CHANGE UP (ref 135–145)
TROPONIN T SERPL-MCNC: < 0.06 NG/ML — SIGNIFICANT CHANGE UP (ref 0–0.06)
WBC # BLD: 6.82 K/UL — SIGNIFICANT CHANGE UP (ref 3.8–10.5)
WBC # FLD AUTO: 6.82 K/UL — SIGNIFICANT CHANGE UP (ref 3.8–10.5)

## 2018-02-22 PROCEDURE — 93306 TTE W/DOPPLER COMPLETE: CPT | Mod: 26

## 2018-02-22 PROCEDURE — 99232 SBSQ HOSP IP/OBS MODERATE 35: CPT

## 2018-02-22 RX ORDER — ASPIRIN/CALCIUM CARB/MAGNESIUM 324 MG
81 TABLET ORAL DAILY
Qty: 0 | Refills: 0 | Status: DISCONTINUED | OUTPATIENT
Start: 2018-02-22 | End: 2018-02-22

## 2018-02-22 RX ORDER — ASPIRIN/CALCIUM CARB/MAGNESIUM 324 MG
1 TABLET ORAL
Qty: 30 | Refills: 0 | OUTPATIENT
Start: 2018-02-22 | End: 2018-03-23

## 2018-02-22 RX ORDER — TRAMADOL HYDROCHLORIDE 50 MG/1
25 TABLET ORAL ONCE
Qty: 0 | Refills: 0 | Status: DISCONTINUED | OUTPATIENT
Start: 2018-02-22 | End: 2018-02-22

## 2018-02-22 RX ORDER — PANTOPRAZOLE SODIUM 20 MG/1
1 TABLET, DELAYED RELEASE ORAL
Qty: 30 | Refills: 0 | OUTPATIENT
Start: 2018-02-22 | End: 2018-03-23

## 2018-02-22 RX ORDER — METOPROLOL TARTRATE 50 MG
1 TABLET ORAL
Qty: 60 | Refills: 0 | OUTPATIENT
Start: 2018-02-22 | End: 2018-03-23

## 2018-02-22 RX ADMIN — SODIUM CHLORIDE 3 MILLILITER(S): 9 INJECTION INTRAMUSCULAR; INTRAVENOUS; SUBCUTANEOUS at 05:47

## 2018-02-22 RX ADMIN — Medication 81 MILLIGRAM(S): at 12:36

## 2018-02-22 RX ADMIN — SODIUM CHLORIDE 3 MILLILITER(S): 9 INJECTION INTRAMUSCULAR; INTRAVENOUS; SUBCUTANEOUS at 13:47

## 2018-02-22 RX ADMIN — Medication 25 MILLIGRAM(S): at 05:48

## 2018-02-22 RX ADMIN — TRAMADOL HYDROCHLORIDE 25 MILLIGRAM(S): 50 TABLET ORAL at 13:37

## 2018-02-22 RX ADMIN — Medication 25 MILLIGRAM(S): at 17:49

## 2018-02-22 RX ADMIN — HEPARIN SODIUM 1000 UNIT(S)/HR: 5000 INJECTION INTRAVENOUS; SUBCUTANEOUS at 06:58

## 2018-02-22 RX ADMIN — TRAMADOL HYDROCHLORIDE 25 MILLIGRAM(S): 50 TABLET ORAL at 15:32

## 2018-02-22 RX ADMIN — PANTOPRAZOLE SODIUM 40 MILLIGRAM(S): 20 TABLET, DELAYED RELEASE ORAL at 05:48

## 2018-02-22 RX ADMIN — Medication 175 MICROGRAM(S): at 05:48

## 2018-02-22 RX ADMIN — TRAMADOL HYDROCHLORIDE 25 MILLIGRAM(S): 50 TABLET ORAL at 06:57

## 2018-02-22 RX ADMIN — TRAMADOL HYDROCHLORIDE 25 MILLIGRAM(S): 50 TABLET ORAL at 06:21

## 2018-02-22 NOTE — DISCHARGE NOTE ADULT - CARE PROVIDER_API CALL
Zen Azevedo), Cardiovascular Disease; Internal Medicine  [address] [address]  Phone: [phone]  Fax: [phone]

## 2018-02-22 NOTE — DISCHARGE NOTE ADULT - MEDICATION SUMMARY - MEDICATIONS TO TAKE
I will START or STAY ON the medications listed below when I get home from the hospital:    Aspirin Enteric Coated 81 mg oral delayed release tablet  -- 1 tab(s) by mouth once a day  -- Indication: For CAD    Metoprolol Tartrate 25 mg oral tablet  -- 1 tab(s) by mouth 2 times a day  -- Indication: For Tachyarrhythmia    pantoprazole 40 mg oral delayed release tablet  -- 1 tab(s) by mouth once a day  -- Indication: For GERD    levothyroxine 175 mcg (0.175 mg) oral tablet  -- 1 tab(s) by mouth once a day  -- Indication: For Hypothyroid

## 2018-02-22 NOTE — DISCHARGE NOTE ADULT - PATIENT PORTAL LINK FT
You can access the [facility] Patient Portal, offered by [facility], by registering with the following website: [email or web address]

## 2018-02-22 NOTE — DISCHARGE NOTE ADULT - HOSPITAL COURSE
64 y/o F with hx of hypothyroidism presents with palpitations since 11:30 pm. Patient was sleeping and woke up with palpitations. Palpitations were constant, and associated with chest burning, diaphoresis and SOB. He wait an hour to see if the palpitations will resolve but it did not resolve on its own. He came to ED approximately 1:30A. He was found to have HR in 210s with narrow complex regular tachyarrhythmia and which was broke with valsalva maneuver. Denies fever, chills, cough, falls, LOC, abdominal pain, melena, hematochezia, LE edema, calf tenderness, dysuria, diarrhea, or constipation.  He states he was doing well and had no complaints prior.     In the ED patient was found to be SVT    Patient was seen by cardiology who recommended "EP input appreciated, Pt refusing ablation and wants medical therapy, elevated CE, likely stress induced due to SVT with very high rate, cant rule out underlying cad  is offered ischemic work up including cath , risk / benefit discussed with pt and he refusing and wants to do stress test outpt, recheck enzymes, echo, dc hep, asa, off statin due to elevated LFT, cont BB 64 y/o F with hx of hypothyroidism presents with palpitations since 11:30 pm. Patient was sleeping and woke up with palpitations. Palpitations were constant, and associated with chest burning, diaphoresis and SOB. He wait an hour to see if the palpitations will resolve but it did not resolve on its own. He came to ED approximately 1:30A. He was found to have HR in 210s with narrow complex regular tachyarrhythmia and which was broke with valsalva maneuver. Denies fever, chills, cough, falls, LOC, abdominal pain, melena, hematochezia, LE edema, calf tenderness, dysuria, diarrhea, or constipation.  He states he was doing well and had no complaints prior.     In the ED patient was found to be SVT and converted to NSR by valsalva maneuver. Patient was seen by house EP who recommended that patient opposed to ablation, TTE pending, metoprolol 25 mg BID for suppression. Patient was also seen by cardiology who recommended "EP input appreciated, Pt refusing ablation and wants medical therapy, elevated CE, likely stress induced due to SVT with very high rate, cant rule out underlying. Possible cath this admission is offered ischemic work up including cath , risk / benefit discussed with pt and he refusing and wants to do stress test outpt, recheck enzymes, echo, dc hep, asa, off statin due to elevated LFT, cont BB". Patient's initial CE was negative but second set of cardiac enzymes trended up and patient was started on heparin drip for possible NSTEMI 2/2 to rapid heart rate. Repeat cardiac enzymes were negative so heparin drip was discontinued. Patient was also seen by GI who stated that "patient drank 2-3 beers yesterday evening (night of admission), US Abd with hepatic steatosis, hepatitis panel pending and no further inpatient GI workup necessary at this time". Patient also had a TTE which revealed "Normal trileaflet aortic valve. Normal left ventricular internal dimensions and wall thicknesses. Normal left ventricular systolic function. No segmental wall motion abnormalities. Normal right ventricular size and function. *** No previous Echo exam. EF of 65%. Spoke with Dr. Azevedo and patient is cleared for discharge and is to follow up with him in 1 week.     CARDIAC MARKERS ( 22 Feb 2018 05:57 )  x     / < 0.06 ng/mL / 156 u/L / 4.01 ng/mL / x      CARDIAC MARKERS ( 21 Feb 2018 13:49 )  x     / < 0.06 ng/mL / 233 u/L / 7.87 ng/mL / x      CARDIAC MARKERS ( 21 Feb 2018 07:25 )  x     / 0.09 ng/mL / 285 u/L / 7.76 ng/mL / x      CARDIAC MARKERS ( 21 Feb 2018 01:52 )  x     / < 0.06 ng/mL / 377 u/L / 5.32 ng/mL / x

## 2018-02-22 NOTE — PROGRESS NOTE ADULT - PROBLEM SELECTOR PLAN 2
- mildly elevated  - US Abd with hepatic steatosis   - hepatitis panel pending  - no further inpatient gi workup necessary at this time

## 2018-02-22 NOTE — PROGRESS NOTE ADULT - SUBJECTIVE AND OBJECTIVE BOX
INTERVAL HPI/OVERNIGHT EVENTS:    denies n/v/d/c, abdominal pain, melena or brbpr     MEDICATIONS  (STANDING):  aspirin enteric coated 81 milliGRAM(s) Oral daily  levothyroxine 175 MICROGram(s) Oral daily  metoprolol     tartrate 25 milliGRAM(s) Oral two times a day  pantoprazole    Tablet 40 milliGRAM(s) Oral before breakfast  sodium chloride 0.9% lock flush 3 milliLiter(s) IV Push every 8 hours  traMADol 25 milliGRAM(s) Oral once    MEDICATIONS  (PRN):      Allergies    multiple drugs (Unknown)  predniSONE (Unknown)  steroids (Unknown)    Intolerances        Review of Systems:    General:  No wt loss, fevers, chills, night sweats, fatigue   Eyes:  Good vision, no reported pain  ENT:  No sore throat, pain, runny nose, dysphagia  CV:  No pain, palpitations, hypo/hypertension  Resp:  No dyspnea, cough, tachypnea, wheezing  GI:  No pain, No nausea, No vomiting, No diarrhea, No constipation, No weight loss, No fever, No pruritis, No rectal bleeding, No melena, No dysphagia  :  No pain, bleeding, incontinence, nocturia  Muscle:  No pain, weakness  Neuro:  No weakness, tingling, memory problems  Psych:  No fatigue, insomnia, mood problems, depression  Endocrine:  No polyuria, polydypsia, cold/heat intolerance  Heme:  No petechiae, ecchymosis, easy bruisability  Skin:  No rash, tattoos, scars, edema      Vital Signs Last 24 Hrs  T(C): 36.5 (22 Feb 2018 10:32), Max: 36.7 (21 Feb 2018 21:34)  T(F): 97.7 (22 Feb 2018 10:32), Max: 98.1 (21 Feb 2018 21:34)  HR: 68 (22 Feb 2018 10:32) (64 - 70)  BP: 149/71 (22 Feb 2018 10:32) (132/75 - 173/91)  BP(mean): --  RR: 18 (22 Feb 2018 10:32) (16 - 18)  SpO2: 100% (22 Feb 2018 10:32) (98% - 100%)    PHYSICAL EXAM:    Constitutional: NAD  HEENT: EOMI, throat clear  Neck: No LAD, supple  Respiratory: CTA and P  Cardiovascular: S1 and S2, RRR, no M  Gastrointestinal: BS+, soft, NT/ND, neg HSM,  Extremities: No peripheral edema, neg clubbing, cyanosis  Vascular: 2+ peripheral pulses  Neurological: A/O x 3, no focal deficits  Psychiatric: Normal mood, normal affect  Skin: No rashes      LABS:                        15.2   6.82  )-----------( 197      ( 22 Feb 2018 05:57 )             44.7     02-22    139  |  102  |  14  ----------------------------<  111<H>  4.1   |  24  |  0.87    Ca    8.8      22 Feb 2018 05:57  Phos  3.6     02-21  Mg     2.0     02-22    TPro  7.1  /  Alb  4.7  /  TBili  0.3  /  DBili  x   /  AST  69<H>  /  ALT  78<H>  /  AlkPhos  77  02-21    PT/INR - ( 21 Feb 2018 01:52 )   PT: 10.8 SEC;   INR: 0.97          PTT - ( 22 Feb 2018 05:57 )  PTT:57.2 SEC      RADIOLOGY & ADDITIONAL TESTS:  < from: US Abdomen Limited (02.21.18 @ 13:05) >    EXAM:  US ABDOMEN LIMITED        PROCEDURE DATE:  Feb 21 2018         INTERPRETATION:  CLINICAL INFORMATION: Elevated liver function tests.    COMPARISON: None available.    TECHNIQUE: Sonography of the right upper quadrant.     FINDINGS:    Liver:Mildly diffuse increased echogenicity.    Bile ducts: Normal caliber. Common hepatic duct measures 2 mm.     Gallbladder: Within normal limits.        Pancreas: Visualized portions are within normal limits.    Right kidney: 10.9 cm. No hydronephrosis.    Ascites: None.    IVC: Visualized portions are within normal limits.    IMPRESSION:     Suggestive of mild diffuse hepatic steatosis.                  NAHUM PIERRE M.D., ATTENDING RADIOLOGIST  This document has been electronically signed. Feb 21 2018  1:30PM                  < end of copied text >
Patient seen and examined at bedside.    Overnight Events: GERTRUDE. Stayed in SR.    Review Of Systems: No chest pain, shortness of breath, or palpitations            Medications:  aspirin enteric coated 81 milliGRAM(s) Oral daily  levothyroxine 175 MICROGram(s) Oral daily  metoprolol     tartrate 25 milliGRAM(s) Oral two times a day  pantoprazole    Tablet 40 milliGRAM(s) Oral before breakfast  sodium chloride 0.9% lock flush 3 milliLiter(s) IV Push every 8 hours      PAST MEDICAL & SURGICAL HISTORY:  Hypothyroid  No significant past surgical history      Vitals:  T(F): 97.7 (02-22), Max: 98.1 (02-21)  HR: 68 (02-22) (64 - 70)  BP: 149/71 (02-22) (132/75 - 173/91)  RR: 18 (02-22)  SpO2: 100% (02-22)  I&O's Summary      Physical Exam:  Appearance: No acute distress; well appearing  Eyes: PERRL, EOMI, pink conjunctiva  HENT: Normal oral muscosa  Cardiovascular: RRR, S1, S2, no murmurs, rubs, or gallops; no edema; no JVD  Respiratory: Clear to auscultation bilaterally  Gastrointestinal: soft, non-tender, non-distended with normal bowel sounds  Musculoskeletal: No clubbing; no joint deformity   Neurologic: Non-focal  Lymphatic: No lymphadenopathy  Psychiatry: AAOx3, mood & affect appropriate  Skin: No rashes, ecchymoses, or cyanosis                          15.2   6.82  )-----------( 197      ( 22 Feb 2018 05:57 )             44.7     02-22    139  |  102  |  14  ----------------------------<  111<H>  4.1   |  24  |  0.87    Ca    8.8      22 Feb 2018 05:57  Phos  3.6     02-21  Mg     2.0     02-22    TPro  7.1  /  Alb  4.7  /  TBili  0.3  /  DBili  x   /  AST  69<H>  /  ALT  78<H>  /  AlkPhos  77  02-21    PT/INR - ( 21 Feb 2018 01:52 )   PT: 10.8 SEC;   INR: 0.97          PTT - ( 22 Feb 2018 05:57 )  PTT:57.2 SEC  CARDIAC MARKERS ( 22 Feb 2018 05:57 )  x     / < 0.06 ng/mL / 156 u/L / 4.01 ng/mL / x      CARDIAC MARKERS ( 21 Feb 2018 13:49 )  x     / < 0.06 ng/mL / 233 u/L / 7.87 ng/mL / x      CARDIAC MARKERS ( 21 Feb 2018 07:25 )  x     / 0.09 ng/mL / 285 u/L / 7.76 ng/mL / x      CARDIAC MARKERS ( 21 Feb 2018 01:52 )  x     / < 0.06 ng/mL / 377 u/L / 5.32 ng/mL / x        Interpretation of Telemetry:  SR 60-70      A/P:  63M with hypothyroidism presents with palpitations and chest pressure found to be in SVT, converted to NSR with valsalva maneuver.    SVT. Maintained SR. Short RP tachycardia, likely AVNRT.   - patient opposed to ablation  - TTE pending  - metoprolol 25 mg BID for suppression    Rogerio Piedra MD  Cardiology Fellow [id number]
Subjective: Patient seen and examined. No new events except as noted.     SUBJECTIVE/ROS:  feels ok  No chest pain, dyspnea, palpitation, or dizziness.       MEDICATIONS:  MEDICATIONS  (STANDING):  heparin  Infusion.  Unit(s)/Hr (10 mL/Hr) IV Continuous <Continuous>  levothyroxine 175 MICROGram(s) Oral daily  metoprolol     tartrate 25 milliGRAM(s) Oral two times a day  pantoprazole    Tablet 40 milliGRAM(s) Oral before breakfast  sodium chloride 0.9% lock flush 3 milliLiter(s) IV Push every 8 hours      PHYSICAL EXAM:  T(C): 36.4 (02-22-18 @ 05:19), Max: 36.7 (02-21-18 @ 21:34)  HR: 66 (02-22-18 @ 05:19) (64 - 70)  BP: 159/73 (02-22-18 @ 05:19) (132/75 - 173/91)  RR: 18 (02-22-18 @ 05:19) (16 - 18)  SpO2: 98% (02-22-18 @ 05:19) (98% - 100%)  Wt(kg): --  I&O's Summary        Appearance: Normal	  HEENT:   Normal oral mucosa, PERRL, EOMI	  Cardiovascular: Normal S1 S2,    Murmur:   Neck: JVP normal  Respiratory: Lungs clear to auscultation  Gastrointestinal:  Soft, Non-tender, + BS	  Skin: normal   Neuro: No gross deficits.   Psychiatry:  Mood & affect appropriate  Ext: No edema      LABS/DATA:    CARDIAC MARKERS:  CARDIAC MARKERS ( 21 Feb 2018 13:49 )  x     / < 0.06 ng/mL / 233 u/L / 7.87 ng/mL / x      CARDIAC MARKERS ( 21 Feb 2018 07:25 )  x     / 0.09 ng/mL / 285 u/L / 7.76 ng/mL / x      CARDIAC MARKERS ( 21 Feb 2018 01:52 )  x     / < 0.06 ng/mL / 377 u/L / 5.32 ng/mL / x                                    15.2   6.82  )-----------( 197      ( 22 Feb 2018 05:57 )             44.7     02-22    139  |  102  |  14  ----------------------------<  111<H>  4.1   |  24  |  0.87    Ca    8.8      22 Feb 2018 05:57  Phos  3.6     02-21  Mg     2.0     02-22    TPro  7.1  /  Alb  4.7  /  TBili  0.3  /  DBili  x   /  AST  69<H>  /  ALT  78<H>  /  AlkPhos  77  02-21    proBNP:   Lipid Profile:   HgA1c:   TSH:     TELE:  EKG:

## 2018-02-22 NOTE — DISCHARGE NOTE ADULT - PLAN OF CARE
Prevent further episodes of SVT You were prescribed Metoprolol 25mg BID to suppress the symptoms of fast hear rate. Please continue to take it  Please follow up with Dr. Azevedo in 1 week when you are discharged from the hospital for outpatient stress test prevent further elevation of LFTs Please refrain from drinking beer   Please follow up with your PMD in 1 week to re check your liver function test Cont tx Continue to take your Synthroid daily

## 2018-02-22 NOTE — PROGRESS NOTE ADULT - ASSESSMENT
62 y/o F with hx of hypothyroidism presents with palpitations since 11:30 pm admitted for episode of tachyarrhythmia being followed by EP. He was also noted with increased LFTs with US Abd noted w/hepatic steatosis.
SVT  Ep input appreciated  Pt refusing ablation and wants medical therapy     elevated CE  likely stress induced due to SVT with very high rate  cant rule out underlying cad  i offered ischemic work up including cath , risk / benefit discussed with pt and he refusing and wants to do stress test as outpt.  obtain echo  recheck enzymes  dc hep  asa  off statin due to elevated LFT  cont BB    HTN  for now cont BB    elevated LFT  appreciate GI input  US c/w hepatic steatosis   recheck LFT

## 2018-02-22 NOTE — DISCHARGE NOTE ADULT - CARE PLAN
Principal Discharge DX:	Tachyarrhythmia  Goal:	Prevent further episodes of SVT  Assessment and plan of treatment:	You were prescribed Metoprolol 25mg BID to suppress the symptoms of fast hear rate. Please continue to take it  Please follow up with Dr. Azevedo in 1 week when you are discharged from the hospital for outpatient stress test  Secondary Diagnosis:	Elevated LFTs  Goal:	prevent further elevation of LFTs  Assessment and plan of treatment:	Please refrain from drinking beer   Please follow up with your PMD in 1 week to re check your liver function test  Secondary Diagnosis:	Hypothyroid  Goal:	Cont tx  Assessment and plan of treatment:	Continue to take your Synthroid daily

## 2018-03-02 ENCOUNTER — OUTPATIENT (OUTPATIENT)
Dept: OUTPATIENT SERVICES | Facility: HOSPITAL | Age: 63
LOS: 1 days | Discharge: HOME | End: 2018-03-02
Payer: COMMERCIAL

## 2018-03-02 DIAGNOSIS — Z98.890 OTHER SPECIFIED POSTPROCEDURAL STATES: Chronic | ICD-10-CM

## 2018-03-02 PROBLEM — E03.9 HYPOTHYROIDISM, UNSPECIFIED: Chronic | Status: ACTIVE | Noted: 2018-02-21

## 2018-03-02 LAB
BUN SERPL-MCNC: 15 MG/DL — SIGNIFICANT CHANGE UP (ref 7–23)
CALCIUM SERPL-MCNC: 8.8 MG/DL — SIGNIFICANT CHANGE UP (ref 8.4–10.5)
CHLORIDE SERPL-SCNC: 105 MMOL/L — SIGNIFICANT CHANGE UP (ref 98–107)
CO2 SERPL-SCNC: 28 MMOL/L — SIGNIFICANT CHANGE UP (ref 22–31)
CREAT SERPL-MCNC: 0.87 MG/DL — SIGNIFICANT CHANGE UP (ref 0.5–1.3)
GLUCOSE SERPL-MCNC: 91 MG/DL — SIGNIFICANT CHANGE UP (ref 70–99)
HBA1C BLD-MCNC: 6.3 % — HIGH (ref 4–5.6)
HCT VFR BLD CALC: 43.9 % — SIGNIFICANT CHANGE UP (ref 39–50)
HGB BLD-MCNC: 14.8 G/DL — SIGNIFICANT CHANGE UP (ref 13–17)
MCHC RBC-ENTMCNC: 30.3 PG — SIGNIFICANT CHANGE UP (ref 27–34)
MCHC RBC-ENTMCNC: 33.7 % — SIGNIFICANT CHANGE UP (ref 32–36)
MCV RBC AUTO: 90 FL — SIGNIFICANT CHANGE UP (ref 80–100)
NRBC # FLD: 0 — SIGNIFICANT CHANGE UP
PLATELET # BLD AUTO: 194 K/UL — SIGNIFICANT CHANGE UP (ref 150–400)
PMV BLD: 9.8 FL — SIGNIFICANT CHANGE UP (ref 7–13)
POTASSIUM SERPL-MCNC: 4.2 MMOL/L — SIGNIFICANT CHANGE UP (ref 3.5–5.3)
POTASSIUM SERPL-SCNC: 4.2 MMOL/L — SIGNIFICANT CHANGE UP (ref 3.5–5.3)
RBC # BLD: 4.88 M/UL — SIGNIFICANT CHANGE UP (ref 4.2–5.8)
RBC # FLD: 12.2 % — SIGNIFICANT CHANGE UP (ref 10.3–14.5)
SODIUM SERPL-SCNC: 142 MMOL/L — SIGNIFICANT CHANGE UP (ref 135–145)
WBC # BLD: 5.96 K/UL — SIGNIFICANT CHANGE UP (ref 3.8–10.5)
WBC # FLD AUTO: 5.96 K/UL — SIGNIFICANT CHANGE UP (ref 3.8–10.5)

## 2018-03-02 PROCEDURE — 93010 ELECTROCARDIOGRAM REPORT: CPT

## 2018-03-02 RX ORDER — FAMOTIDINE 10 MG/ML
20 INJECTION INTRAVENOUS ONCE
Qty: 0 | Refills: 0 | Status: COMPLETED | OUTPATIENT
Start: 2018-03-02 | End: 2018-03-02

## 2018-03-02 RX ORDER — DEXTROSE 50 % IN WATER 50 %
25 SYRINGE (ML) INTRAVENOUS ONCE
Qty: 0 | Refills: 0 | Status: DISCONTINUED | OUTPATIENT
Start: 2018-03-02 | End: 2018-03-02

## 2018-03-02 RX ORDER — GLUCAGON INJECTION, SOLUTION 0.5 MG/.1ML
1 INJECTION, SOLUTION SUBCUTANEOUS ONCE
Qty: 0 | Refills: 0 | Status: DISCONTINUED | OUTPATIENT
Start: 2018-03-02 | End: 2018-03-02

## 2018-03-02 RX ORDER — DEXTROSE 50 % IN WATER 50 %
1 SYRINGE (ML) INTRAVENOUS ONCE
Qty: 0 | Refills: 0 | Status: DISCONTINUED | OUTPATIENT
Start: 2018-03-02 | End: 2018-03-02

## 2018-03-02 RX ORDER — ACETAMINOPHEN 500 MG
650 TABLET ORAL ONCE
Qty: 0 | Refills: 0 | Status: COMPLETED | OUTPATIENT
Start: 2018-03-02 | End: 2018-03-02

## 2018-03-02 RX ORDER — DIPHENHYDRAMINE HCL 50 MG
25 CAPSULE ORAL ONCE
Qty: 0 | Refills: 0 | Status: COMPLETED | OUTPATIENT
Start: 2018-03-02 | End: 2018-03-02

## 2018-03-02 RX ORDER — SODIUM CHLORIDE 9 MG/ML
500 INJECTION INTRAMUSCULAR; INTRAVENOUS; SUBCUTANEOUS
Qty: 0 | Refills: 0 | Status: DISCONTINUED | OUTPATIENT
Start: 2018-03-02 | End: 2018-03-17

## 2018-03-02 RX ORDER — SODIUM CHLORIDE 9 MG/ML
3 INJECTION INTRAMUSCULAR; INTRAVENOUS; SUBCUTANEOUS EVERY 8 HOURS
Qty: 0 | Refills: 0 | Status: DISCONTINUED | OUTPATIENT
Start: 2018-03-02 | End: 2018-03-17

## 2018-03-02 RX ORDER — SODIUM CHLORIDE 9 MG/ML
1000 INJECTION, SOLUTION INTRAVENOUS
Qty: 0 | Refills: 0 | Status: DISCONTINUED | OUTPATIENT
Start: 2018-03-02 | End: 2018-03-02

## 2018-03-02 RX ORDER — INSULIN LISPRO 100/ML
VIAL (ML) SUBCUTANEOUS
Qty: 0 | Refills: 0 | Status: DISCONTINUED | OUTPATIENT
Start: 2018-03-02 | End: 2018-03-02

## 2018-03-02 RX ORDER — DEXTROSE 50 % IN WATER 50 %
12.5 SYRINGE (ML) INTRAVENOUS ONCE
Qty: 0 | Refills: 0 | Status: DISCONTINUED | OUTPATIENT
Start: 2018-03-02 | End: 2018-03-02

## 2018-03-02 RX ADMIN — Medication 650 MILLIGRAM(S): at 14:09

## 2018-03-02 RX ADMIN — Medication 25 MILLIGRAM(S): at 15:08

## 2018-03-02 RX ADMIN — SODIUM CHLORIDE 75 MILLILITER(S): 9 INJECTION INTRAMUSCULAR; INTRAVENOUS; SUBCUTANEOUS at 15:40

## 2018-03-02 RX ADMIN — FAMOTIDINE 20 MILLIGRAM(S): 10 INJECTION INTRAVENOUS at 15:09

## 2018-03-02 RX ADMIN — Medication 650 MILLIGRAM(S): at 13:35

## 2018-03-02 NOTE — CHART NOTE - NSCHARTNOTEFT_GEN_A_CORE
The patient was noted to have elevated HgbA1c 6.3. The patient was made aware. Prediabetes education given. The patient was recommended to f/u with PMD for further treatment.

## 2018-03-02 NOTE — H&P CARDIOLOGY - FAMILY HISTORY
Aunt  Still living? No  Family history of heart attack, Age at diagnosis: Age Unknown  Family history of stroke, Age at diagnosis: Age Unknown     Uncle  Still living? Yes, Estimated age: Age Unknown  Family history of heart attack, Age at diagnosis: Age Unknown

## 2018-03-02 NOTE — H&P CARDIOLOGY - PMH
Borderline hyperlipidemia    Hepatic steatosis    Hypothyroid    Pre-diabetes    Prostate cancer  treatment with laser therapy  SVT (supraventricular tachycardia)

## 2018-03-02 NOTE — CHART NOTE - NSCHARTNOTEFT_GEN_A_CORE
patient presented to [facility] post cath and noted to have hive of right forearm suspected IV contrast allergy; Denies SOB, pruritis. given Benadryl 250mg IV x1 and Pepcid 20mg IV x1. no throat swelling, no swelling noted. Patient made aware of new allergy. Dr Dueñas made aware. patient presented to [facility] post cath and noted to have hive of right forearm suspected IV contrast allergy; Denies SOB, pruritis. given Benadryl 25mg IV x1 and Pepcid 20mg IV x1. no throat swelling, no swelling noted. Patient made aware of new allergy. Dr Dueñas made aware.

## 2018-03-02 NOTE — H&P CARDIOLOGY - HISTORY OF PRESENT ILLNESS
63 year old male former smoker with family history of CAD with multiple steroid allergies to Class A/B/D1/D2, hypothyroidism, pre-diabetes, borderline diabetes with recent episode of SVT that awoke him from sleep with palpitations and diaphoresis requiring hospitalization with elevated troponins suspected in setting of rapid herat rate, started on metoprolol and discharge with outpatient cardiologist follow up. Underwent a  treadmill Stress test with reported abnormal results. Admits to an increase in fatigue and decrease in exercise tolerance over the past 1 week.  Denies chest pain SOB, dizziness, syncope, edema, orthopnea.   In light of patients cardiac risk factors, symptoms and abnormal noninvasive test findings there is high suspicion for CAD. Patient is now referred to [facility] for a cardiac catheterization with possible PTCA/stent.

## 2018-03-05 ENCOUNTER — INPATIENT (INPATIENT)
Facility: HOSPITAL | Age: 63
LOS: 0 days | Discharge: HOME | End: 2018-03-06
Attending: INTERNAL MEDICINE | Admitting: INTERNAL MEDICINE
Payer: COMMERCIAL

## 2018-03-05 VITALS
DIASTOLIC BLOOD PRESSURE: 59 MMHG | SYSTOLIC BLOOD PRESSURE: 106 MMHG | RESPIRATION RATE: 17 BRPM | HEART RATE: 94 BPM | TEMPERATURE: 99 F | OXYGEN SATURATION: 95 %

## 2018-03-05 DIAGNOSIS — Z98.890 OTHER SPECIFIED POSTPROCEDURAL STATES: Chronic | ICD-10-CM

## 2018-03-05 LAB
BLD GP AB SCN SERPL QL: NEGATIVE — SIGNIFICANT CHANGE UP
RH IG SCN BLD-IMP: POSITIVE — SIGNIFICANT CHANGE UP

## 2018-03-05 PROCEDURE — 93613 INTRACARDIAC EPHYS 3D MAPG: CPT

## 2018-03-05 PROCEDURE — 93653 COMPRE EP EVAL TX SVT: CPT

## 2018-03-05 PROCEDURE — 93623 PRGRMD STIMJ&PACG IV RX NFS: CPT | Mod: 26

## 2018-03-05 PROCEDURE — 93621 COMP EP EVL L PAC&REC C SINS: CPT | Mod: 26

## 2018-03-05 PROCEDURE — 93010 ELECTROCARDIOGRAM REPORT: CPT

## 2018-03-05 RX ORDER — SODIUM CHLORIDE 9 MG/ML
3 INJECTION INTRAMUSCULAR; INTRAVENOUS; SUBCUTANEOUS EVERY 8 HOURS
Qty: 0 | Refills: 0 | Status: DISCONTINUED | OUTPATIENT
Start: 2018-03-05 | End: 2018-03-06

## 2018-03-05 RX ORDER — LEVOTHYROXINE SODIUM 125 MCG
175 TABLET ORAL DAILY
Qty: 0 | Refills: 0 | Status: DISCONTINUED | OUTPATIENT
Start: 2018-03-05 | End: 2018-03-06

## 2018-03-05 RX ORDER — HEPARIN SODIUM 5000 [USP'U]/ML
5000 INJECTION INTRAVENOUS; SUBCUTANEOUS EVERY 12 HOURS
Qty: 0 | Refills: 0 | Status: DISCONTINUED | OUTPATIENT
Start: 2018-03-06 | End: 2018-03-06

## 2018-03-05 RX ORDER — PANTOPRAZOLE SODIUM 20 MG/1
40 TABLET, DELAYED RELEASE ORAL
Qty: 0 | Refills: 0 | Status: DISCONTINUED | OUTPATIENT
Start: 2018-03-05 | End: 2018-03-06

## 2018-03-05 RX ORDER — ASPIRIN/CALCIUM CARB/MAGNESIUM 324 MG
81 TABLET ORAL DAILY
Qty: 0 | Refills: 0 | Status: DISCONTINUED | OUTPATIENT
Start: 2018-03-05 | End: 2018-03-06

## 2018-03-05 RX ORDER — METOPROLOL TARTRATE 50 MG
25 TABLET ORAL
Qty: 0 | Refills: 0 | Status: DISCONTINUED | OUTPATIENT
Start: 2018-03-05 | End: 2018-03-06

## 2018-03-05 RX ADMIN — SODIUM CHLORIDE 3 MILLILITER(S): 9 INJECTION INTRAMUSCULAR; INTRAVENOUS; SUBCUTANEOUS at 21:36

## 2018-03-05 NOTE — H&P CARDIOLOGY - HISTORY OF PRESENT ILLNESS
63 y.o. male presents today for elective SVT ablation. 63 y.o. male presents today for elective SVT ablation. The patient recently diagnosed with SVT, started on Metoprolol. As per EP evaluation, the patient was recommended to have SVT ablation. The patient claims that feels better since he was started on Metoprolol. The patient denies  chest pain, SOB, palpitations, lightheadedness, dizziness, syncope, increased lower extremity edema, fever chills, abdominal pain, N/V/C/D BRBPR, melena, urinary symptoms.    Cardiac cath 3/2/18 CORONARY VESSELS: The coronary circulation is right dominant.  LM:   --  LM: Normal.  LAD:   --  LAD: Normal.  CX:   --  Circumflex: Normal.  RCA:   --  RCA: Normal.  COMPLICATIONS: There were no complications.  DIAGNOSTIC IMPRESSIONS: The coronary anatomy is normal. Left ventricular  function is normal.  DIAGNOSTIC RECOMMENDATIONS: Medical therapy and proceed with SVT ablation

## 2018-03-05 NOTE — H&P CARDIOLOGY - PMH
Borderline hyperlipidemia    Eczema    Hepatic steatosis    Hypothyroid    Pre-diabetes    Prostate cancer  treatment with laser therapy  SVT (supraventricular tachycardia)

## 2018-03-06 ENCOUNTER — TRANSCRIPTION ENCOUNTER (OUTPATIENT)
Age: 63
End: 2018-03-06

## 2018-03-06 VITALS
HEART RATE: 72 BPM | DIASTOLIC BLOOD PRESSURE: 74 MMHG | OXYGEN SATURATION: 100 % | TEMPERATURE: 98 F | RESPIRATION RATE: 17 BRPM | SYSTOLIC BLOOD PRESSURE: 139 MMHG

## 2018-03-06 LAB
BUN SERPL-MCNC: 14 MG/DL — SIGNIFICANT CHANGE UP (ref 7–23)
CALCIUM SERPL-MCNC: 8.7 MG/DL — SIGNIFICANT CHANGE UP (ref 8.4–10.5)
CHLORIDE SERPL-SCNC: 104 MMOL/L — SIGNIFICANT CHANGE UP (ref 98–107)
CO2 SERPL-SCNC: 28 MMOL/L — SIGNIFICANT CHANGE UP (ref 22–31)
CREAT SERPL-MCNC: 1 MG/DL — SIGNIFICANT CHANGE UP (ref 0.5–1.3)
GLUCOSE SERPL-MCNC: 95 MG/DL — SIGNIFICANT CHANGE UP (ref 70–99)
HCT VFR BLD CALC: 42.7 % — SIGNIFICANT CHANGE UP (ref 39–50)
HGB BLD-MCNC: 13.7 G/DL — SIGNIFICANT CHANGE UP (ref 13–17)
MAGNESIUM SERPL-MCNC: 2 MG/DL — SIGNIFICANT CHANGE UP (ref 1.6–2.6)
MCHC RBC-ENTMCNC: 28.9 PG — SIGNIFICANT CHANGE UP (ref 27–34)
MCHC RBC-ENTMCNC: 32.1 % — SIGNIFICANT CHANGE UP (ref 32–36)
MCV RBC AUTO: 90.1 FL — SIGNIFICANT CHANGE UP (ref 80–100)
NRBC # FLD: 0 — SIGNIFICANT CHANGE UP
PHOSPHATE SERPL-MCNC: 4.2 MG/DL — SIGNIFICANT CHANGE UP (ref 2.5–4.5)
PLATELET # BLD AUTO: 172 K/UL — SIGNIFICANT CHANGE UP (ref 150–400)
PMV BLD: 10.2 FL — SIGNIFICANT CHANGE UP (ref 7–13)
POTASSIUM SERPL-MCNC: 4.2 MMOL/L — SIGNIFICANT CHANGE UP (ref 3.5–5.3)
POTASSIUM SERPL-SCNC: 4.2 MMOL/L — SIGNIFICANT CHANGE UP (ref 3.5–5.3)
RBC # BLD: 4.74 M/UL — SIGNIFICANT CHANGE UP (ref 4.2–5.8)
RBC # FLD: 12.5 % — SIGNIFICANT CHANGE UP (ref 10.3–14.5)
SODIUM SERPL-SCNC: 142 MMOL/L — SIGNIFICANT CHANGE UP (ref 135–145)
WBC # BLD: 6.3 K/UL — SIGNIFICANT CHANGE UP (ref 3.8–10.5)
WBC # FLD AUTO: 6.3 K/UL — SIGNIFICANT CHANGE UP (ref 3.8–10.5)

## 2018-03-06 RX ORDER — METOPROLOL TARTRATE 50 MG
1 TABLET ORAL
Qty: 30 | Refills: 0 | OUTPATIENT
Start: 2018-03-06 | End: 2018-04-04

## 2018-03-06 RX ORDER — PANTOPRAZOLE SODIUM 20 MG/1
1 TABLET, DELAYED RELEASE ORAL
Qty: 0 | Refills: 0 | COMMUNITY
Start: 2018-03-06

## 2018-03-06 RX ORDER — LEVOTHYROXINE SODIUM 125 MCG
1 TABLET ORAL
Qty: 0 | Refills: 0 | COMMUNITY

## 2018-03-06 RX ORDER — ASPIRIN/CALCIUM CARB/MAGNESIUM 324 MG
1 TABLET ORAL
Qty: 0 | Refills: 0 | COMMUNITY
Start: 2018-03-06

## 2018-03-06 RX ORDER — LEVOTHYROXINE SODIUM 125 MCG
1 TABLET ORAL
Qty: 0 | Refills: 0 | COMMUNITY
Start: 2018-03-06

## 2018-03-06 RX ORDER — METOPROLOL TARTRATE 50 MG
25 TABLET ORAL DAILY
Qty: 0 | Refills: 0 | Status: DISCONTINUED | OUTPATIENT
Start: 2018-03-06 | End: 2018-03-06

## 2018-03-06 RX ADMIN — Medication 81 MILLIGRAM(S): at 11:04

## 2018-03-06 RX ADMIN — PANTOPRAZOLE SODIUM 40 MILLIGRAM(S): 20 TABLET, DELAYED RELEASE ORAL at 05:05

## 2018-03-06 RX ADMIN — Medication 175 MICROGRAM(S): at 05:04

## 2018-03-06 RX ADMIN — SODIUM CHLORIDE 3 MILLILITER(S): 9 INJECTION INTRAMUSCULAR; INTRAVENOUS; SUBCUTANEOUS at 05:05

## 2018-03-06 RX ADMIN — Medication 25 MILLIGRAM(S): at 05:04

## 2018-03-06 RX ADMIN — Medication 25 MILLIGRAM(S): at 11:04

## 2018-03-06 RX ADMIN — HEPARIN SODIUM 5000 UNIT(S): 5000 INJECTION INTRAVENOUS; SUBCUTANEOUS at 05:04

## 2018-03-06 NOTE — DISCHARGE NOTE ADULT - NS AS ACTIVITY OBS
Stairs allowed/Walking-Indoors allowed/Showering allowed/Do not drive or operate machinery/Walking-Outdoors allowed/No Heavy lifting/straining

## 2018-03-06 NOTE — DISCHARGE NOTE ADULT - PROVIDER TOKENS
TOKEN:'[id number]',FREE:[LAST:[Follow up],PHONE:[(   )    -],FAX:[(   )    -],ADDRESS:[Follow up Tuesday, 3/27/18 at 9:30 with Dr. Cohn]] TOKBLANCA:'[id number]',FREE:[LAST:[Follow up],PHONE:[(   )    -],FAX:[(   )    -],ADDRESS:[Follow up Tuesday, 3/27/18 at 9:30 with Dr. Cohn]],TOKEN:'[id number]'

## 2018-03-06 NOTE — DISCHARGE NOTE ADULT - PATIENT PORTAL LINK FT
You can access the [facility] Patient Portal, offered by [facility], by registering with the following website: [email or web address]

## 2018-03-06 NOTE — DISCHARGE NOTE ADULT - CARE PROVIDER_API CALL
Mitesh Cohn), Cardiac Electrophysiology; Cardiovascular Disease; Internal Medicine  [address] [address] [place] [address]  Phone: [phone]  Fax: [phone]    Follow up,   Follow up Tuesday, 3/27/18 at 9:30 with Dr. Cohn  Phone: (   )    -  Fax: (   )    - Mitesh Cohn), Cardiac Electrophysiology; Cardiovascular Disease; Internal Medicine  [address] [address] [place] [address]  [address] [place] [address]  Phone: [phone]  Fax: [phone]    Follow up,   Follow up Tuesday, 3/27/18 at 9:30 with Dr. Cohn  Phone: (   )    -  Fax: (   )    -    Odell Arriaga), Cardiovascular Disease; Internal Medicine  [address] [place] [address]  Phone: [phone]  Fax: [phone]

## 2018-03-06 NOTE — DISCHARGE NOTE ADULT - ADDITIONAL INSTRUCTIONS
Follow up Tuesday, 3/27/18 at 9:30 with Dr. Cohn Follow up Tuesday, 3/27/18 at 9:30 with Dr. Cohn  Follow up with Dr. Arriaga

## 2018-03-06 NOTE — DISCHARGE NOTE ADULT - CARE PROVIDERS DIRECT ADDRESSES
[email or web address],DirectAddress_Unknown ,[email or web address],DirectAddress_Unknown,DirectAddress_Unknown

## 2018-03-06 NOTE — DISCHARGE NOTE ADULT - HOSPITAL COURSE
63 y.o. male presents today for elective SVT ablation    3/5 s/p SVT ablation   and Afib DCCV     Follow up Tuesday, 3/27/18 at 9:30 with Dr. Cohn 63 y.o. male presented  elective SVT ablation    3/5 s/p SVT ablation was performed  and Afib treated with DCCV.  He was monitored for some HTN and medications adjusted.    He was stable for discharge 3/6/18.    Follow up Tuesday, 3/27/18 at 9:30 with Dr. Cohn

## 2018-03-06 NOTE — DISCHARGE NOTE ADULT - MEDICATION SUMMARY - MEDICATIONS TO TAKE
I will START or STAY ON the medications listed below when I get home from the hospital:    aspirin 81 mg oral delayed release tablet  -- 1 tab(s) by mouth once a day  -- Indication: For blood thinner    metoprolol succinate 25 mg oral tablet, extended release  -- 1 tab(s) by mouth once a day  -- Indication: For heart     pantoprazole 40 mg oral delayed release tablet  -- 1 tab(s) by mouth once a day (before a meal)  -- Indication: For Stomach protection/gerd    levothyroxine 175 mcg (0.175 mg) oral tablet  -- 1 tab(s) by mouth once a day  -- Indication: For thyroid

## 2018-03-06 NOTE — DISCHARGE NOTE ADULT - PLAN OF CARE
To restore or maintain a normal heart rate and rhythm, to prevent blood clots, and decrease the risks of stroke CVA/TIA. Please take your medications as prescribed.  Continue to take your blood thinner as prescribed. Low fat diet, reduce caffeine intake, and exercise at least 30 minutes daily. Please take your medications as prescribed.  Continue to take your blood thinner as prescribed.  Low fat diet, reduce caffeine intake, and exercise at least 30 minutes daily.

## 2018-03-06 NOTE — PROGRESS NOTE ADULT - ASSESSMENT
This is a 64 yo male with recently diagnosed symptomatic SVT, s/p AVNRT ablation yesterday.  Cardiac catheterization with normal coronaries.  Echo with normal LV systolic function.  Did well post ablation.  No events or symptoms overnight.  Post procedure ablation teaching done.  Continue Toprol XL 25 mg  daily -> started for elevated blood pressure. Can be adjusted as an outpatient by Dr. Arriaga   Follow-up appointment on March 27, 2018 at 9:30am 4th floor Oncology [facility] [phone].  Anticipate discharge home today.

## 2018-03-06 NOTE — DISCHARGE NOTE ADULT - CARE PLAN
Principal Discharge DX:	SVT (supraventricular tachycardia)  Goal:	To restore or maintain a normal heart rate and rhythm, to prevent blood clots, and decrease the risks of stroke CVA/TIA.  Assessment and plan of treatment:	Please take your medications as prescribed.  Continue to take your blood thinner as prescribed. Low fat diet, reduce caffeine intake, and exercise at least 30 minutes daily.  Secondary Diagnosis:	Afib  Assessment and plan of treatment:	Please take your medications as prescribed.  Continue to take your blood thinner as prescribed.  Low fat diet, reduce caffeine intake, and exercise at least 30 minutes daily.

## 2018-03-06 NOTE — PROGRESS NOTE ADULT - SUBJECTIVE AND OBJECTIVE BOX
Patient ambulating, feeling well. No complaints. Denies chest pain, shortness of breath, palpitations or lightheadedness. No events overnight.    Vital Signs Last 24 Hrs  T(C): 36.7 (06 Mar 2018 05:00), Max: 37.1 (05 Mar 2018 21:10)  T(F): 98 (06 Mar 2018 05:00), Max: 98.7 (05 Mar 2018 21:10)  HR: 75 (06 Mar 2018 05:00) (70 - 101)  BP: 121/87 (06 Mar 2018 05:00) (96/80 - 129/74)  BP(mean): --  RR: 18 (06 Mar 2018 05:00) (13 - 19)  SpO2: 100% (06 Mar 2018 05:00) (95% - 100%)      EKG  Telemetry:  Normal sinus rhythm 70-80's.   MEDICATIONS  (STANDING):  aspirin enteric coated 81 milliGRAM(s) Oral daily  heparin  Injectable 5000 Unit(s) SubCutaneous every 12 hours  levothyroxine 175 MICROGram(s) Oral daily  metoprolol succinate ER 25 milliGRAM(s) Oral daily  pantoprazole    Tablet 40 milliGRAM(s) Oral before breakfast  sodium chloride 0.9% lock flush 3 milliLiter(s) IV Push every 8 hours    MEDICATIONS  (PRN):    Physical exam:   Gen- well developed. Well nourished. NAD  Resp- clear to auscultation. No wheezing, rales or rhonchi  CV- S1 and S2 normal. RRR. No murmurs, gallops ro rubs.  ABD- soft, nontender +bowel sounds  EXT- Right groin tender to palpation but no bleeding, ecchymosis or hematoma.  Neur non focal                            13.7   6.30  )-----------( 172      ( 06 Mar 2018 06:30 )             42.7       03-06    142  |  104  |  14  ----------------------------<  95  4.2   |  28  |  1.00    Ca    8.7      06 Mar 2018 06:30  Phos  4.2     03-06  Mg     2.0     03-06    ECHOCARDIOGRAM:    Ejection Fraction (Alvinicholtz): 65 %  ------------------------------------------------------------------------  OBSERVATIONS:  Mitral Valve: Normal mitral valve.  Aortic Root: Normal aortic root.  Aortic Valve: Normal trileaflet aortic valve.  Left Atrium: Normal left atrium.  Left Ventricle: Normal left ventricular systolic function.  No segmental wall motion abnormalities. Normal left  ventricular internal dimensions and wall thicknesses.  Right Heart: Normal right atrium. Normal right ventricular  size and function. Normal tricuspid valve. Normal pulmonic  valve.  Pericardium/PleuraNormal pericardium with no pericardial  effusion.  ------------------------------------------------------------------------  CONCLUSIONS:  1. Normal trileaflet aortic valve.  2. Normal left ventricular internal dimensions and wall  thicknesses.  3. Normal left ventricular systolic function. No segmental  wall motion abnormalities.  4. Normal right ventricular size and function.  *** No previous Echo exam.  ------------------------------------------------------------------------  Confirmed on 2/22/2018 - 16:51:42 by Marii Recinos MD  ------------------------------------------------------------------------      CARDIAC CATHETERIZATION:    VENTRICLES: There were no left ventricular global or regional wall motion  abnormalities. EF estimated was 65 %.  VALVES: MITRAL VALVE: The mitral valve exhibited no regurgitation.  CORONARY VESSELS: The coronary circulation is right dominant.  LM:   --  LM: Normal.  LAD:   --  LAD: Normal.  CX:   --  Circumflex: Normal.  RCA:   --  RCA: Normal.  COMPLICATIONS: Therewere no complications.  DIAGNOSTIC IMPRESSIONS: The coronary anatomy is normal. Left ventricular  function is normal.  DIAGNOSTIC RECOMMENDATIONS: Medical therapy and proceed with SVT ablation  Prepared and signed by  Bob Dueñas M.D.  Signed 03/02/2018 14:39:53

## 2018-03-12 ENCOUNTER — TRANSCRIPTION ENCOUNTER (OUTPATIENT)
Age: 63
End: 2018-03-12

## 2018-03-27 ENCOUNTER — APPOINTMENT (OUTPATIENT)
Dept: ELECTROPHYSIOLOGY | Facility: CLINIC | Age: 63
End: 2018-03-27
Payer: COMMERCIAL

## 2018-03-27 ENCOUNTER — NON-APPOINTMENT (OUTPATIENT)
Age: 63
End: 2018-03-27

## 2018-03-27 VITALS
HEART RATE: 54 BPM | BODY MASS INDEX: 28 KG/M2 | WEIGHT: 200 LBS | DIASTOLIC BLOOD PRESSURE: 78 MMHG | SYSTOLIC BLOOD PRESSURE: 156 MMHG | RESPIRATION RATE: 16 BRPM | OXYGEN SATURATION: 97 % | HEIGHT: 71 IN

## 2018-03-27 VITALS — DIASTOLIC BLOOD PRESSURE: 74 MMHG | SYSTOLIC BLOOD PRESSURE: 136 MMHG

## 2018-03-27 DIAGNOSIS — Z86.79 OTHER SPECIFIED POSTPROCEDURAL STATES: ICD-10-CM

## 2018-03-27 DIAGNOSIS — I47.1 SUPRAVENTRICULAR TACHYCARDIA: ICD-10-CM

## 2018-03-27 DIAGNOSIS — Z98.890 OTHER SPECIFIED POSTPROCEDURAL STATES: ICD-10-CM

## 2018-03-27 PROCEDURE — 99213 OFFICE O/P EST LOW 20 MIN: CPT

## 2018-03-27 PROCEDURE — 93000 ELECTROCARDIOGRAM COMPLETE: CPT

## 2018-03-27 RX ORDER — METOPROLOL TARTRATE 25 MG/1
25 TABLET, FILM COATED ORAL
Qty: 180 | Refills: 1 | Status: ACTIVE | COMMUNITY
Start: 2018-03-27

## 2018-03-27 RX ORDER — METOPROLOL SUCCINATE 25 MG/1
25 TABLET, EXTENDED RELEASE ORAL
Qty: 30 | Refills: 2 | Status: DISCONTINUED | COMMUNITY
Start: 2018-03-27 | End: 2018-03-27

## 2018-03-27 RX ORDER — ASPIRIN 81 MG/1
81 TABLET ORAL DAILY
Refills: 0 | Status: ACTIVE | COMMUNITY
Start: 2018-03-27

## 2018-03-27 RX ORDER — PANTOPRAZOLE 40 MG/1
40 TABLET, DELAYED RELEASE ORAL
Qty: 90 | Refills: 2 | Status: DISCONTINUED | COMMUNITY
Start: 2018-03-27 | End: 2018-03-27

## 2018-03-27 RX ORDER — AMLODIPINE BESYLATE 5 MG/1
5 TABLET ORAL DAILY
Refills: 0 | Status: ACTIVE | COMMUNITY
Start: 2018-03-27

## 2018-07-17 PROBLEM — C61 MALIGNANT NEOPLASM OF PROSTATE: Chronic | Status: ACTIVE | Noted: 2018-03-02

## 2018-11-14 NOTE — DISCHARGE NOTE ADULT - FUNCTIONAL SCREEN CURRENT LEVEL: DRESSING, MLM
HEARING AID     AUDIOLOGY/HEARING AIDS:  [employer]ia 2Px ITE aids fit on 7/3/2018 (obtained via [employer])  size 13 battery  HF4 filters    repair/replacement covered until 7/18/2021  services covered until 7/18/2019 or first 5 visits then billable to T19  batteries billable to T19    jw 7/2018    REASON FOR VISIT:  Patient's wife came in to  hearing aids and wax filters dropped off yesterday. Wife thought she had the wrong size filters as she was unable to successfully change filter.     SERVICES PROVIDED:  Reviewed how to change filter with wife; she was able to do so in office.     RECOMMENDATIONS:  NA    FOLLOW-UP:  As needed.   (0) independent

## 2019-08-28 ENCOUNTER — APPOINTMENT (OUTPATIENT)
Dept: ORTHOPEDIC SURGERY | Facility: CLINIC | Age: 64
End: 2019-08-28
Payer: COMMERCIAL

## 2019-08-28 VITALS
DIASTOLIC BLOOD PRESSURE: 83 MMHG | SYSTOLIC BLOOD PRESSURE: 162 MMHG | HEIGHT: 71 IN | HEART RATE: 72 BPM | WEIGHT: 200 LBS | BODY MASS INDEX: 28 KG/M2

## 2019-08-28 PROBLEM — R73.03 PREDIABETES: Chronic | Status: ACTIVE | Noted: 2018-03-02

## 2019-08-28 PROBLEM — E78.5 HYPERLIPIDEMIA, UNSPECIFIED: Chronic | Status: ACTIVE | Noted: 2018-03-02

## 2019-08-28 PROBLEM — K76.0 FATTY (CHANGE OF) LIVER, NOT ELSEWHERE CLASSIFIED: Chronic | Status: ACTIVE | Noted: 2018-03-02

## 2019-08-28 PROBLEM — I47.1 SUPRAVENTRICULAR TACHYCARDIA: Chronic | Status: ACTIVE | Noted: 2018-03-02

## 2019-08-28 PROBLEM — L30.9 DERMATITIS, UNSPECIFIED: Chronic | Status: ACTIVE | Noted: 2018-03-05

## 2019-08-28 PROCEDURE — 73562 X-RAY EXAM OF KNEE 3: CPT

## 2019-08-28 PROCEDURE — 99203 OFFICE O/P NEW LOW 30 MIN: CPT

## 2019-08-28 RX ORDER — DICLOFENAC SODIUM 75 MG/1
75 TABLET, DELAYED RELEASE ORAL
Qty: 20 | Refills: 1 | Status: ACTIVE | COMMUNITY
Start: 2019-08-28 | End: 1900-01-01

## 2019-09-17 ENCOUNTER — APPOINTMENT (OUTPATIENT)
Dept: ORTHOPEDIC SURGERY | Facility: CLINIC | Age: 64
End: 2019-09-17
Payer: COMMERCIAL

## 2019-09-17 DIAGNOSIS — M65.9 SYNOVITIS AND TENOSYNOVITIS, UNSPECIFIED: ICD-10-CM

## 2019-09-17 DIAGNOSIS — M17.11 UNILATERAL PRIMARY OSTEOARTHRITIS, RIGHT KNEE: ICD-10-CM

## 2019-09-17 PROCEDURE — 99213 OFFICE O/P EST LOW 20 MIN: CPT

## 2019-10-11 ENCOUNTER — RX RENEWAL (OUTPATIENT)
Age: 64
End: 2019-10-11

## 2019-10-11 RX ORDER — DICLOFENAC SODIUM 100 MG/1
100 TABLET, FILM COATED, EXTENDED RELEASE ORAL
Qty: 30 | Refills: 0 | Status: ACTIVE | COMMUNITY
Start: 2019-09-17 | End: 1900-01-01

## 2019-11-04 ENCOUNTER — TRANSCRIPTION ENCOUNTER (OUTPATIENT)
Age: 64
End: 2019-11-04

## 2019-11-19 ENCOUNTER — APPOINTMENT (OUTPATIENT)
Dept: ORTHOPEDIC SURGERY | Facility: CLINIC | Age: 64
End: 2019-11-19

## 2019-11-24 NOTE — H&P ADULT - PSH
The patient is a 52y Male complaining of body aches and general malaise x 2 days No significant past surgical history

## 2020-11-02 NOTE — ED PROVIDER NOTE - PSH
RE: Plan of Care    Dear Dr. SHAI Barrett MD    Thank you for referring Sana Weir. The following information reflects my assessment and plan of care.           Plan of Care 20   Effective from: 2020  Effective to: 2020    Plan ID: [id number]           Participants     Name Type Comments Contact Info    SHAI Barrett MD Provider  [phone]    Leanne Cramer, PT PT             Sana Weir MRN:[id number] (:1954 66 year old F)            Evaluation     Author: Leanne Cramer, PT Status: Signed Last edited: 2020  2:45 PM       Referred by: SHAI Barrett MD; Medical Diagnosis (from order):    Diagnosis Information      Diagnosis    M43.16 (ICD-10-CM) - Spondylolisthesis, lumbar region                Physical Therapy -  Daily Treatment Note    Visit:  10     SUBJECTIVE                                                                                                             States this has been her best week yet.  Performing REIL every 2 hrs.  No PSIS pain.  Has been able to return to ambulating x 4 miles.  When RLS occurs she is able to abolish it with gentle LE stretching with the belt.  Sleeping improved to 4-6 hrs/night.  Still has anxiety which interferes with sleep.  No paresthesias in her calves.  One episode of paresthesias in the plantar surface of R foot.  Standing ryan= 1 hr. Limits her sitting due to LBP/LE symptoms.  Able to  Lay on her R side with modifications with pillows.     Functional Change: See above    Pain / Symptoms:  Pain/symptom is: intermittent  Pain rating (out of 10): Current: 0   Location: See above   Quality / Description: ache.  Alleviating Factors: change in position. Ex    Progression since onset: improved    OBJECTIVE                                                                                                                     Range of Motion (ROM)   (degrees unless noted; active unless noted; norms in ( ); negative=lacking to 0,  positive=beyond 0)   Lumbar:    - Flexion(60-80):  WNL     - Extension (25):  40%   Details / Comments: L SG: min decreased, R: mod decreased      Palpation:   Comments / Details: Decreased PA glides L4 and 5      TREATMENT                                                                                                                re-evaluation completed    Therapeutic Exercise:  HEP review    Manual Therapy:  Grade 2-3 mobs to L-spine   Grade 1-2 to B SIJ, spring to sacrum       ASSESSMENT                                                                                                             A: Pt has progressed well with PT.  She is demonstrating increased spinal ROM and is reporting improvement in L-spine and PSIS pain and LE paresthesias including RLS.  I in HEP and proper posture with ADL's. Aware of avoiding aggressive ROM or aggressive stretching ex's for best result.  Pain/symptoms after session: 0    Patient Education:   Results of above outlined education: Verbalizes understanding and Demonstrates understanding      PLAN                                                                                                                           Discharge from skilled therapy with instructions/recommendations to continue home exercise program        GOALS                                                                                                                       Long Term Goals: To be met by end of plan of care:      Home Exercise Program: Independent with progressed and modified home exercise program (HEP)      Status:  Met     Community Ambulation: Ambulate community distances on even terrain: without reported difficultywith centralized 2-3/10 pain uneven terrain: without reported difficulty with centralized 2-3/10 pain     Status:  Met   Transfers: Complete able to transfer onto R side with centralized 2-3/10 pain     Status:  Partially met   Status Details: Needs to position with  pillows    Sleep: Sleep 5 hrs with centralized 2-3/10 pain     Status:  Partially met   Stand: Stand for greater than 60 minutes and without reported difficulty with centralized 2-3/10 pain     Status:  Met   Status Details: Sleep interrupted more due to anxiety      Procedures and total treatment time documented Time Entry flowsheet.         Updated Participants     Name Type Comments Contact Handy Barrett MD Provider  [phone]    Signature pending    Leanne Cramer, PT PT                  Please complete the attached form to indicate your approval of the plan of care upon receipt.  Insurance compliance requires your approval be on file.  Should you have any questions, feel free to contact me.     Leanne Cramer PT  [facility]  [address] [place] [id number]  Phone: [phone]  Fax: [phone]                RE: Plan of Care    I certify the need for these services, furnished under this plan of treatment and while under my care.  I agree with the plan of care as stated and request that therapy proceed.        __________________________________________________________________________________  MD Signature         Date   Time No significant past surgical history

## 2022-08-10 ENCOUNTER — APPOINTMENT (OUTPATIENT)
Dept: ORTHOPEDIC SURGERY | Facility: CLINIC | Age: 67
End: 2022-08-10

## 2022-08-10 VITALS
BODY MASS INDEX: 28 KG/M2 | SYSTOLIC BLOOD PRESSURE: 169 MMHG | WEIGHT: 200 LBS | HEIGHT: 71 IN | HEART RATE: 71 BPM | DIASTOLIC BLOOD PRESSURE: 77 MMHG

## 2022-08-10 DIAGNOSIS — M17.12 UNILATERAL PRIMARY OSTEOARTHRITIS, LEFT KNEE: ICD-10-CM

## 2022-08-10 DIAGNOSIS — M65.9 SYNOVITIS AND TENOSYNOVITIS, UNSPECIFIED: ICD-10-CM

## 2022-08-10 PROCEDURE — 73562 X-RAY EXAM OF KNEE 3: CPT | Mod: LT

## 2022-08-10 PROCEDURE — 99213 OFFICE O/P EST LOW 20 MIN: CPT

## 2022-08-10 RX ORDER — DICLOFENAC SODIUM 100 MG/1
100 TABLET, FILM COATED, EXTENDED RELEASE ORAL
Qty: 30 | Refills: 1 | Status: ACTIVE | COMMUNITY
Start: 2022-08-10 | End: 1900-01-01

## 2022-08-15 NOTE — H&P ADULT - SKIN/BREAST
details… Go for blood tests as directed. Your doctor will do lab tests at regular visits to monitor the effects of this medicine. Please follow up with your doctor and keep your health care provider appointments.

## 2024-04-04 ENCOUNTER — APPOINTMENT (OUTPATIENT)
Dept: ORTHOPEDIC SURGERY | Facility: CLINIC | Age: 69
End: 2024-04-04
Payer: MEDICARE

## 2024-04-04 VITALS
HEART RATE: 62 BPM | BODY MASS INDEX: 27.86 KG/M2 | WEIGHT: 199 LBS | HEIGHT: 71 IN | SYSTOLIC BLOOD PRESSURE: 168 MMHG | DIASTOLIC BLOOD PRESSURE: 79 MMHG

## 2024-04-04 DIAGNOSIS — G56.01 CARPAL TUNNEL SYNDROME, RIGHT UPPER LIMB: ICD-10-CM

## 2024-04-04 DIAGNOSIS — M65.331 TRIGGER FINGER, RIGHT MIDDLE FINGER: ICD-10-CM

## 2024-04-04 DIAGNOSIS — M18.11 UNILATERAL PRIMARY OSTEOARTHRITIS OF FIRST CARPOMETACARPAL JOINT, RIGHT HAND: ICD-10-CM

## 2024-04-04 DIAGNOSIS — M65.311 TRIGGER THUMB, RIGHT THUMB: ICD-10-CM

## 2024-04-04 DIAGNOSIS — M18.12 UNILATERAL PRIMARY OSTEOARTHRITIS OF FIRST CARPOMETACARPAL JOINT, LEFT HAND: ICD-10-CM

## 2024-04-04 PROCEDURE — 99203 OFFICE O/P NEW LOW 30 MIN: CPT | Mod: 25

## 2024-04-04 PROCEDURE — 20550 NJX 1 TENDON SHEATH/LIGAMENT: CPT | Mod: F5

## 2024-04-07 PROBLEM — M18.11 PRIMARY OSTEOARTHRITIS OF FIRST CARPOMETACARPAL JOINT OF RIGHT HAND: Status: ACTIVE | Noted: 2024-04-07

## 2024-04-07 PROBLEM — G56.01 CARPAL TUNNEL SYNDROME OF RIGHT WRIST: Status: ACTIVE | Noted: 2024-04-07

## 2024-04-07 PROBLEM — M65.331 TRIGGER MIDDLE FINGER OF RIGHT HAND: Status: ACTIVE | Noted: 2024-04-07

## 2024-04-07 PROBLEM — M65.311 TRIGGER FINGER OF RIGHT THUMB: Status: ACTIVE | Noted: 2024-04-07

## 2024-04-07 PROBLEM — M18.12 PRIMARY OSTEOARTHRITIS OF FIRST CARPOMETACARPAL JOINT OF LEFT HAND: Status: ACTIVE | Noted: 2024-04-07

## 2024-04-07 NOTE — PHYSICAL EXAM
[de-identified] : Right wrist: Good motion no localizing findings. Basal joint manipulation slight crepitus no notable pain.    Right hand: Right thumb A1 pulley is tender.   Triggering with mild discomfort with thumb flexion/extension. Right long finger subtle triggering with full flexion than extension. Mild pain associated. There is no other A1 pulley tenderness or triggering in any other finger, right hand. No pertinent MP, PIP, or DIP joint contributory findings, except some Heberden's nodes; none are clinically painful.  Left wrist: Good motion no localizing findings. Basal joint manipulation: Slight crepitus, no pain.  Left hand:  no A1 pulley tenderness and no triggering in any finger. No pertinent MP, PIP, or DIP joint contributory findings, except some Heberden's nodes; none are clinically painful.  Neurologic Normal sensation at rest all fingers bilaterally. Phalen's test with median nerve compression: Positive on the right at 30 seconds Equivocal on the left. Radial nerve motor and sensory and ulnar nerve motor and sensory are intact.  Skin: No cyanosis, clubbing, edema or rashes. Vascular: Radial pulses intact. Lymphatic: No streaking or epitrochlear adenopathy. The patient is awake, alert, and oriented. Affect appropriate. Cooperative.

## 2024-04-07 NOTE — PROCEDURE
[] : The injection was done in 2 phases with the first phase being subcutaneous injection of lidocaine 1.5 cc subcutaneously as anesthetic. When adequate level of anesthesia was achieved, the Celestone injection was undertaken. [Thumb] : thumb [3rd] : 3rd finger

## 2024-04-07 NOTE — ASSESSMENT
[FreeTextEntry1] : Patient's primary problem is triggering the right thumb.  Also noted is triggering of the right long finger which is slightly less painful.  Patient states that he had been skin tested for cortisone and had a reaction.  After lengthy discussion regarding possible adverse reaction to steroid patient states he had a spine injection of cortisone approximately 9 years ago and had no adverse reaction.  With additional discussion patient came to believe that he was told of adverse reaction to topical hydrocortisone cream but no reaction to injectable steroid. As a result of this discussion and after discussion of risks and potential adverse effect from trigger finger cortisone injection the patient accepted and was treated with 2 phase cortisone injection for right trigger thumb and right long trigger finger.  No complication and no immediate adverse reaction. Patient has numbness  on occasion in the right hand but only at night.  Patient states that he wears a splint which is quite helpful but does not totally eliminate numbness and tingling. I advised patient that if this becomes more frequent, intense, or otherwise problematic he should return to discuss diagnostic and treatment options.  If, for example, the patient has no adverse reaction to today's cortisone injections then a carpal tunnel cortisone injection be a consideration as a diagnostic test and therapeutic trial.  Alternatively, electrodiagnostic studies could be done if there is a need to confirm carpal tunnel syndrome and/or assess severity.  Prognosis uncertain. Patient should call immediately if he has any adverse reaction following today's cortisone injections.  Otherwise, communication is not necessary. Patient should return if symptoms continue or if symptoms subside online or recur. A lengthy and detailed discussion was held with the patient regarding analysis, treatment, and recommendations. All questions have been answered. At the conclusion the patient expressed acceptance, understanding and agreement with the plan.

## 2024-04-07 NOTE — CONSULT LETTER
[Consult Letter:] : I had the pleasure of evaluating your patient, [unfilled]. [Dear  ___] : Dear  [unfilled], [FreeTextEntry1] : The patient was seen in hand consultation today. A copy of my office note is enclosed for your review with the patient's knowledge and consent.  Sincerely,  Clark Kate MD Chief, Hand Surgery Residency  ([phone]) Department of Orthopaedic Surgery  [facility] Professor of Orthopaedic Surgery Nader CASPER at [facility]

## 2024-04-07 NOTE — HISTORY OF PRESENT ILLNESS
[FreeTextEntry1] : Patient is 70 yo RHD male presents as a NEW HAND patient for hand evaluation. Retired retail , [employer]. Patient previously treated by Luiz Rizzo MD for left knee pain.  TODAY: The patient had R3 trigger finger began approx 6 months ago. Still locks up but not as painful. Rt thumb became painful x 1month. Clicking with tender nodule at A1.  Pt reports right thumb index long finger "go numb at me" at night. Symptoms began possibly 2 years ago. Patient wears a splint at night.  Splint is helpful although it does not eliminate symptoms. Numbness tingling on left is "very rare" Patient not aware of triggering in the left hand.  Patient states that he has been skin tested and has an allergy to steroid cream. Patient believes that this is a reaction to the topical steroid medication. Patient had a steroid injection by Polo Oneil MD for back pain approximately 9 years ago and did not have any adverse reaction.

## 2024-09-09 ENCOUNTER — NON-APPOINTMENT (OUTPATIENT)
Age: 69
End: 2024-09-09

## 2024-09-10 ENCOUNTER — APPOINTMENT (OUTPATIENT)
Dept: ORTHOPEDIC SURGERY | Facility: CLINIC | Age: 69
End: 2024-09-10
Payer: MEDICARE

## 2024-09-10 VITALS — HEIGHT: 71 IN | BODY MASS INDEX: 28.56 KG/M2 | WEIGHT: 204 LBS

## 2024-09-10 DIAGNOSIS — M17.12 UNILATERAL PRIMARY OSTEOARTHRITIS, LEFT KNEE: ICD-10-CM

## 2024-09-10 PROCEDURE — 73564 X-RAY EXAM KNEE 4 OR MORE: CPT | Mod: LT

## 2024-09-10 PROCEDURE — 99213 OFFICE O/P EST LOW 20 MIN: CPT

## 2024-09-10 RX ORDER — MELOXICAM 15 MG/1
15 TABLET ORAL
Qty: 30 | Refills: 2 | Status: ACTIVE | COMMUNITY
Start: 2024-09-10 | End: 1900-01-01

## 2024-09-10 RX ORDER — HYALURONATE SODIUM 20 MG/2 ML
20 SYRINGE (ML) INTRAARTICULAR
Qty: 1 | Refills: 0 | Status: ACTIVE | OUTPATIENT
Start: 2024-09-10

## 2024-09-10 NOTE — DISCUSSION/SUMMARY
[de-identified] : Left knee osteoarthritis.  I discussed the treatment of degenerative arthritis with the patient at length today. I described the spectrum of treatment from nonoperative modalities to total joint arthroplasty. Noninvasive and nonoperative treatment modalities include weight reduction, activity modification with low impact exercise, PRN use of acetaminophen or anti-inflammatory medication if tolerated, glucosamine/chondroitin supplements, and physical therapy. Further treatments can include corticosteroid injection and the use of hyaluronic acid injections. Definitive treatment can certainly include total joint arthroplasty also. The risks and benefits of each treatment options was discussed and all questions were answered.  Given prescription Mobic side effects discussed.  Physical therapy.  He has an allergy to hydrocortisone's we will avoid corticosteroid injection.  Will attempt authorization for hyaluronic acid injections.  He may follow-up as needed

## 2024-09-10 NOTE — PHYSICAL EXAM
[de-identified] : General Exam  Well developed, well nourished  No apparent distress  Oriented to person, place, and time  Mood: Normal  Affect: Normal  Balance and coordination: Normal  Gait: Normal  left knee exam    Skin: Clean, dry, intact Inspection: No obvious malalignment, no masses, no swelling, no effusion.  Tenderness: + MJLT. No LJLT. No tenderness over the medial and lateral patella facets. No ttp medial/lateral epicondyle, patella tendon, tibial tubercle, pes anserinus, or proximal fibula.  ROM: 0 to 130  pain with deep flexion  Stability: Stable to varus, valgus, lachman testing. Negative anterior/posterior drawer.  Additional tests: Negative McMurrays test, Negative patellar grind test.   Strength: 5/5 Q/H/TA/GS/EHL, no atrophy  Neuro: In tact to light touch throughout in dp/sp/tib/sanchez/saph nerve districutions,  DTR's normal Pulses: 2+ DP/PT pulses.   [de-identified] : The following radiographs were ordered and read by me during this patients visit. I reviewed each radiograph in detail with the patient and discussed the findings as highlighted below.  4 views left knee obtained today moderate arthritic changes joint space narrowing medial patellofemoral compartments

## 2024-09-29 NOTE — HISTORY OF PRESENT ILLNESS
[de-identified] : 70yo male presents complaining of left knee pain for several years.  Pain is medial lateral aspect of the knee.  He reports a sharp pain anterior aspect.  Worse with walking and standing for long periods of time.  He was seen a couple years ago for left knee osteoarthritis.  No new injuries since then.  Trying to rest using ice.  Pain is intermittent.  Denies numbness tingling  The patient's past medical history, past surgical history, medications, allergies, and social history were reviewed by me today with the patient and documented accordingly. In addition, the patient's family history, which is noncontributory to this visit, was also reviewed.
No

## 2024-11-15 ENCOUNTER — APPOINTMENT (OUTPATIENT)
Dept: ORTHOPEDIC SURGERY | Facility: CLINIC | Age: 69
End: 2024-11-15

## 2024-11-22 ENCOUNTER — APPOINTMENT (OUTPATIENT)
Dept: ORTHOPEDIC SURGERY | Facility: CLINIC | Age: 69
End: 2024-11-22

## 2024-11-26 ENCOUNTER — APPOINTMENT (OUTPATIENT)
Dept: ORTHOPEDIC SURGERY | Facility: CLINIC | Age: 69
End: 2024-11-26
Payer: MEDICARE

## 2024-11-26 VITALS — WEIGHT: 204 LBS | BODY MASS INDEX: 28.56 KG/M2 | HEIGHT: 71 IN

## 2024-11-26 PROCEDURE — 20610 DRAIN/INJ JOINT/BURSA W/O US: CPT | Mod: LT

## 2024-12-03 ENCOUNTER — APPOINTMENT (OUTPATIENT)
Dept: ORTHOPEDIC SURGERY | Facility: CLINIC | Age: 69
End: 2024-12-03
Payer: MEDICARE

## 2024-12-03 VITALS — BODY MASS INDEX: 28.7 KG/M2 | HEIGHT: 71 IN | WEIGHT: 205 LBS

## 2024-12-03 DIAGNOSIS — M17.12 UNILATERAL PRIMARY OSTEOARTHRITIS, LEFT KNEE: ICD-10-CM

## 2024-12-03 PROCEDURE — 20610 DRAIN/INJ JOINT/BURSA W/O US: CPT | Mod: LT

## 2024-12-06 ENCOUNTER — APPOINTMENT (OUTPATIENT)
Dept: ORTHOPEDIC SURGERY | Facility: CLINIC | Age: 69
End: 2024-12-06

## 2024-12-10 ENCOUNTER — APPOINTMENT (OUTPATIENT)
Dept: ORTHOPEDIC SURGERY | Facility: CLINIC | Age: 69
End: 2024-12-10
Payer: MEDICARE

## 2024-12-10 VITALS — HEIGHT: 71 IN | BODY MASS INDEX: 28.7 KG/M2 | WEIGHT: 205 LBS

## 2024-12-10 DIAGNOSIS — M17.10 UNILATERAL PRIMARY OSTEOARTHRITIS, UNSPECIFIED KNEE: ICD-10-CM

## 2024-12-10 PROCEDURE — 20610 DRAIN/INJ JOINT/BURSA W/O US: CPT | Mod: LT

## 2024-12-11 ENCOUNTER — RX RENEWAL (OUTPATIENT)
Age: 69
End: 2024-12-11

## 2025-03-26 ENCOUNTER — NON-APPOINTMENT (OUTPATIENT)
Age: 70
End: 2025-03-26

## 2025-03-27 ENCOUNTER — APPOINTMENT (OUTPATIENT)
Dept: ORTHOPEDIC SURGERY | Facility: CLINIC | Age: 70
End: 2025-03-27
Payer: MEDICARE

## 2025-03-27 VITALS — BODY MASS INDEX: 28.7 KG/M2 | WEIGHT: 205 LBS | HEIGHT: 71 IN

## 2025-03-27 DIAGNOSIS — Z78.9 OTHER SPECIFIED HEALTH STATUS: ICD-10-CM

## 2025-03-27 DIAGNOSIS — M65.331 TRIGGER FINGER, RIGHT MIDDLE FINGER: ICD-10-CM

## 2025-03-27 DIAGNOSIS — M18.11 UNILATERAL PRIMARY OSTEOARTHRITIS OF FIRST CARPOMETACARPAL JOINT, RIGHT HAND: ICD-10-CM

## 2025-03-27 DIAGNOSIS — M65.311 TRIGGER THUMB, RIGHT THUMB: ICD-10-CM

## 2025-03-27 DIAGNOSIS — M18.12 UNILATERAL PRIMARY OSTEOARTHRITIS OF FIRST CARPOMETACARPAL JOINT, LEFT HAND: ICD-10-CM

## 2025-03-27 DIAGNOSIS — G56.02 CARPAL TUNNEL SYNDROME, LEFT UPPER LIMB: ICD-10-CM

## 2025-03-27 DIAGNOSIS — G56.01 CARPAL TUNNEL SYNDROME, RIGHT UPPER LIMB: ICD-10-CM

## 2025-03-27 PROCEDURE — 99214 OFFICE O/P EST MOD 30 MIN: CPT

## 2025-03-28 PROBLEM — Z78.9 NON-SMOKER: Status: ACTIVE | Noted: 2025-03-28

## 2025-04-10 ENCOUNTER — APPOINTMENT (OUTPATIENT)
Dept: ORTHOPEDIC SURGERY | Facility: CLINIC | Age: 70
End: 2025-04-10
Payer: MEDICARE

## 2025-04-10 VITALS — WEIGHT: 205 LBS | BODY MASS INDEX: 28.7 KG/M2 | HEIGHT: 71 IN

## 2025-04-10 DIAGNOSIS — M18.11 UNILATERAL PRIMARY OSTEOARTHRITIS OF FIRST CARPOMETACARPAL JOINT, RIGHT HAND: ICD-10-CM

## 2025-04-10 DIAGNOSIS — G56.02 CARPAL TUNNEL SYNDROME, LEFT UPPER LIMB: ICD-10-CM

## 2025-04-10 DIAGNOSIS — G56.01 CARPAL TUNNEL SYNDROME, RIGHT UPPER LIMB: ICD-10-CM

## 2025-04-10 DIAGNOSIS — M18.12 UNILATERAL PRIMARY OSTEOARTHRITIS OF FIRST CARPOMETACARPAL JOINT, LEFT HAND: ICD-10-CM

## 2025-04-10 PROCEDURE — 99214 OFFICE O/P EST MOD 30 MIN: CPT | Mod: 25

## 2025-04-10 PROCEDURE — 20526 THER INJECTION CARP TUNNEL: CPT | Mod: 50

## 2025-08-22 ENCOUNTER — APPOINTMENT (OUTPATIENT)
Dept: ORTHOPEDIC SURGERY | Facility: CLINIC | Age: 70
End: 2025-08-22
Payer: MEDICARE

## 2025-08-22 VITALS — HEIGHT: 71 IN | WEIGHT: 215 LBS | BODY MASS INDEX: 30.1 KG/M2

## 2025-08-22 DIAGNOSIS — M17.12 UNILATERAL PRIMARY OSTEOARTHRITIS, LEFT KNEE: ICD-10-CM

## 2025-08-22 DIAGNOSIS — M17.11 UNILATERAL PRIMARY OSTEOARTHRITIS, RIGHT KNEE: ICD-10-CM

## 2025-08-22 PROCEDURE — 99214 OFFICE O/P EST MOD 30 MIN: CPT | Mod: 25

## 2025-08-22 PROCEDURE — 73564 X-RAY EXAM KNEE 4 OR MORE: CPT | Mod: 50

## 2025-08-22 PROCEDURE — 20610 DRAIN/INJ JOINT/BURSA W/O US: CPT | Mod: LT

## 2025-08-22 RX ORDER — HYALURONATE SODIUM 20 MG/2 ML
20 SYRINGE (ML) INTRAARTICULAR
Qty: 1 | Refills: 0 | Status: ACTIVE | OUTPATIENT
Start: 2025-08-22